# Patient Record
Sex: MALE | Race: WHITE | NOT HISPANIC OR LATINO | Employment: FULL TIME | URBAN - METROPOLITAN AREA
[De-identification: names, ages, dates, MRNs, and addresses within clinical notes are randomized per-mention and may not be internally consistent; named-entity substitution may affect disease eponyms.]

---

## 2017-01-20 ENCOUNTER — OFFICE VISIT - HEALTHEAST (OUTPATIENT)
Dept: FAMILY MEDICINE | Facility: CLINIC | Age: 57
End: 2017-01-20

## 2017-01-20 DIAGNOSIS — J32.9 SINUSITIS: ICD-10-CM

## 2017-01-20 DIAGNOSIS — B96.89 BACTERIAL CONJUNCTIVITIS OF BOTH EYES: ICD-10-CM

## 2017-01-20 DIAGNOSIS — J45.30 MILD PERSISTENT ASTHMA: ICD-10-CM

## 2017-01-20 DIAGNOSIS — H10.9 BACTERIAL CONJUNCTIVITIS OF BOTH EYES: ICD-10-CM

## 2017-01-23 ENCOUNTER — OFFICE VISIT - HEALTHEAST (OUTPATIENT)
Dept: FAMILY MEDICINE | Facility: CLINIC | Age: 57
End: 2017-01-23

## 2017-01-23 DIAGNOSIS — I10 HYPERTENSION: ICD-10-CM

## 2017-01-23 ASSESSMENT — MIFFLIN-ST. JEOR: SCORE: 1873.13

## 2017-01-24 ENCOUNTER — COMMUNICATION - HEALTHEAST (OUTPATIENT)
Dept: FAMILY MEDICINE | Facility: CLINIC | Age: 57
End: 2017-01-24

## 2017-01-24 LAB
ATRIAL RATE - MUSE: 60 BPM
DIASTOLIC BLOOD PRESSURE - MUSE: NORMAL MMHG
INTERPRETATION ECG - MUSE: NORMAL
P AXIS - MUSE: 15 DEGREES
PR INTERVAL - MUSE: 192 MS
QRS DURATION - MUSE: 94 MS
QT - MUSE: 402 MS
QTC - MUSE: 402 MS
R AXIS - MUSE: -50 DEGREES
SYSTOLIC BLOOD PRESSURE - MUSE: NORMAL MMHG
T AXIS - MUSE: 3 DEGREES
VENTRICULAR RATE- MUSE: 60 BPM

## 2017-02-02 ENCOUNTER — OFFICE VISIT - HEALTHEAST (OUTPATIENT)
Dept: FAMILY MEDICINE | Facility: CLINIC | Age: 57
End: 2017-02-02

## 2017-02-02 DIAGNOSIS — E78.5 HYPERLIPIDEMIA, UNSPECIFIED HYPERLIPIDEMIA TYPE: ICD-10-CM

## 2017-02-02 DIAGNOSIS — E66.3 OVERWEIGHT (BMI 25.0-29.9): ICD-10-CM

## 2017-02-02 DIAGNOSIS — I10 ESSENTIAL HYPERTENSION: ICD-10-CM

## 2017-02-02 DIAGNOSIS — H10.10 ALLERGIC CONJUNCTIVITIS: ICD-10-CM

## 2017-02-02 DIAGNOSIS — J45.31 MILD PERSISTENT ASTHMA WITH ACUTE EXACERBATION: ICD-10-CM

## 2017-02-02 LAB
CHOLEST SERPL-MCNC: 249 MG/DL
FASTING STATUS PATIENT QL REPORTED: YES
HDLC SERPL-MCNC: 44 MG/DL
LDLC SERPL CALC-MCNC: 170 MG/DL
TRIGL SERPL-MCNC: 175 MG/DL

## 2017-02-03 ENCOUNTER — COMMUNICATION - HEALTHEAST (OUTPATIENT)
Dept: FAMILY MEDICINE | Facility: CLINIC | Age: 57
End: 2017-02-03

## 2017-11-30 ENCOUNTER — COMMUNICATION - HEALTHEAST (OUTPATIENT)
Dept: FAMILY MEDICINE | Facility: CLINIC | Age: 57
End: 2017-11-30

## 2018-01-03 ENCOUNTER — OFFICE VISIT - HEALTHEAST (OUTPATIENT)
Dept: FAMILY MEDICINE | Facility: CLINIC | Age: 58
End: 2018-01-03

## 2018-01-03 DIAGNOSIS — M22.2X2 BILATERAL PATELLOFEMORAL SYNDROME: ICD-10-CM

## 2018-01-03 DIAGNOSIS — Z00.00 ROUTINE GENERAL MEDICAL EXAMINATION AT A HEALTH CARE FACILITY: ICD-10-CM

## 2018-01-03 DIAGNOSIS — I10 ESSENTIAL HYPERTENSION: ICD-10-CM

## 2018-01-03 DIAGNOSIS — E66.09 CLASS 1 OBESITY DUE TO EXCESS CALORIES WITHOUT SERIOUS COMORBIDITY WITH BODY MASS INDEX (BMI) OF 30.0 TO 30.9 IN ADULT: ICD-10-CM

## 2018-01-03 DIAGNOSIS — E78.5 HYPERLIPIDEMIA, UNSPECIFIED HYPERLIPIDEMIA TYPE: ICD-10-CM

## 2018-01-03 DIAGNOSIS — M79.672 LEFT FOOT PAIN: ICD-10-CM

## 2018-01-03 DIAGNOSIS — L71.9 ACNE ROSACEA: ICD-10-CM

## 2018-01-03 DIAGNOSIS — J30.9 AR (ALLERGIC RHINITIS): ICD-10-CM

## 2018-01-03 DIAGNOSIS — R73.01 IMPAIRED FASTING GLUCOSE: ICD-10-CM

## 2018-01-03 DIAGNOSIS — E66.811 CLASS 1 OBESITY DUE TO EXCESS CALORIES WITHOUT SERIOUS COMORBIDITY WITH BODY MASS INDEX (BMI) OF 30.0 TO 30.9 IN ADULT: ICD-10-CM

## 2018-01-03 DIAGNOSIS — L30.9 DERMATITIS: ICD-10-CM

## 2018-01-03 DIAGNOSIS — J45.31 MILD PERSISTENT ASTHMA WITH ACUTE EXACERBATION: ICD-10-CM

## 2018-01-03 DIAGNOSIS — H10.13 ALLERGIC CONJUNCTIVITIS OF BOTH EYES: ICD-10-CM

## 2018-01-03 DIAGNOSIS — M22.2X1 BILATERAL PATELLOFEMORAL SYNDROME: ICD-10-CM

## 2018-01-03 LAB
ANION GAP SERPL CALCULATED.3IONS-SCNC: 10 MMOL/L (ref 5–18)
BUN SERPL-MCNC: 14 MG/DL (ref 8–22)
CALCIUM SERPL-MCNC: 10.2 MG/DL (ref 8.5–10.5)
CHLORIDE BLD-SCNC: 100 MMOL/L (ref 98–107)
CHOLEST SERPL-MCNC: 308 MG/DL
CO2 SERPL-SCNC: 27 MMOL/L (ref 22–31)
CREAT SERPL-MCNC: 1.01 MG/DL (ref 0.7–1.3)
FASTING STATUS PATIENT QL REPORTED: YES
GFR SERPL CREATININE-BSD FRML MDRD: >60 ML/MIN/1.73M2
GLUCOSE BLD-MCNC: 96 MG/DL (ref 70–125)
HDLC SERPL-MCNC: 55 MG/DL
LDLC SERPL CALC-MCNC: 207 MG/DL
POTASSIUM BLD-SCNC: 4.6 MMOL/L (ref 3.5–5)
SODIUM SERPL-SCNC: 137 MMOL/L (ref 136–145)
TRIGL SERPL-MCNC: 232 MG/DL

## 2018-01-03 RX ORDER — CLOTRIMAZOLE AND BETAMETHASONE DIPROPIONATE 10; .64 MG/G; MG/G
CREAM TOPICAL
Qty: 45 G | Refills: 2 | Status: SHIPPED | OUTPATIENT
Start: 2018-01-03

## 2018-01-03 ASSESSMENT — MIFFLIN-ST. JEOR: SCORE: 1817.57

## 2018-01-04 ENCOUNTER — AMBULATORY - HEALTHEAST (OUTPATIENT)
Dept: FAMILY MEDICINE | Facility: CLINIC | Age: 58
End: 2018-01-04

## 2018-01-04 ENCOUNTER — COMMUNICATION - HEALTHEAST (OUTPATIENT)
Dept: FAMILY MEDICINE | Facility: CLINIC | Age: 58
End: 2018-01-04

## 2018-01-04 DIAGNOSIS — E78.5 HYPERLIPIDEMIA: ICD-10-CM

## 2018-01-24 ENCOUNTER — COMMUNICATION - HEALTHEAST (OUTPATIENT)
Dept: FAMILY MEDICINE | Facility: CLINIC | Age: 58
End: 2018-01-24

## 2018-02-15 ENCOUNTER — OFFICE VISIT - HEALTHEAST (OUTPATIENT)
Dept: FAMILY MEDICINE | Facility: CLINIC | Age: 58
End: 2018-02-15

## 2018-02-15 DIAGNOSIS — J01.90 ACUTE SINUSITIS: ICD-10-CM

## 2018-02-26 ENCOUNTER — COMMUNICATION - HEALTHEAST (OUTPATIENT)
Dept: FAMILY MEDICINE | Facility: CLINIC | Age: 58
End: 2018-02-26

## 2018-06-25 ENCOUNTER — OFFICE VISIT - HEALTHEAST (OUTPATIENT)
Dept: FAMILY MEDICINE | Facility: CLINIC | Age: 58
End: 2018-06-25

## 2018-06-25 ENCOUNTER — RECORDS - HEALTHEAST (OUTPATIENT)
Dept: GENERAL RADIOLOGY | Facility: CLINIC | Age: 58
End: 2018-06-25

## 2018-06-25 DIAGNOSIS — M10.9 GOUT FLARE: ICD-10-CM

## 2018-06-25 DIAGNOSIS — M79.671 PAIN IN RIGHT FOOT: ICD-10-CM

## 2018-06-25 LAB
BASOPHILS # BLD AUTO: 0.1 THOU/UL (ref 0–0.2)
BASOPHILS NFR BLD AUTO: 2 % (ref 0–2)
EOSINOPHIL # BLD AUTO: 0.3 THOU/UL (ref 0–0.4)
EOSINOPHIL NFR BLD AUTO: 7 % (ref 0–6)
ERYTHROCYTE [DISTWIDTH] IN BLOOD BY AUTOMATED COUNT: 13.1 % (ref 11–14.5)
HCT VFR BLD AUTO: 41 % (ref 40–54)
HGB BLD-MCNC: 14.1 G/DL (ref 14–18)
LYMPHOCYTES # BLD AUTO: 1.3 THOU/UL (ref 0.8–4.4)
LYMPHOCYTES NFR BLD AUTO: 27 % (ref 20–40)
MCH RBC QN AUTO: 30.9 PG (ref 27–34)
MCHC RBC AUTO-ENTMCNC: 34.4 G/DL (ref 32–36)
MCV RBC AUTO: 90 FL (ref 80–100)
MONOCYTES # BLD AUTO: 0.6 THOU/UL (ref 0–0.9)
MONOCYTES NFR BLD AUTO: 14 % (ref 2–10)
NEUTROPHILS # BLD AUTO: 2.4 THOU/UL (ref 2–7.7)
NEUTROPHILS NFR BLD AUTO: 51 % (ref 50–70)
PLATELET # BLD AUTO: 226 THOU/UL (ref 140–440)
PMV BLD AUTO: 9.9 FL (ref 8.5–12.5)
RBC # BLD AUTO: 4.56 MILL/UL (ref 4.4–6.2)
URATE SERPL-MCNC: 6.9 MG/DL (ref 3–8)
WBC: 4.7 THOU/UL (ref 4–11)

## 2018-06-25 ASSESSMENT — MIFFLIN-ST. JEOR: SCORE: 1869.05

## 2018-06-27 ENCOUNTER — COMMUNICATION - HEALTHEAST (OUTPATIENT)
Dept: FAMILY MEDICINE | Facility: CLINIC | Age: 58
End: 2018-06-27

## 2018-06-28 ENCOUNTER — COMMUNICATION - HEALTHEAST (OUTPATIENT)
Dept: SCHEDULING | Facility: CLINIC | Age: 58
End: 2018-06-28

## 2018-06-28 ENCOUNTER — RECORDS - HEALTHEAST (OUTPATIENT)
Dept: ADMINISTRATIVE | Facility: OTHER | Age: 58
End: 2018-06-28

## 2018-07-16 ENCOUNTER — COMMUNICATION - HEALTHEAST (OUTPATIENT)
Dept: SCHEDULING | Facility: CLINIC | Age: 58
End: 2018-07-16

## 2018-07-16 ENCOUNTER — COMMUNICATION - HEALTHEAST (OUTPATIENT)
Dept: FAMILY MEDICINE | Facility: CLINIC | Age: 58
End: 2018-07-16

## 2018-07-16 DIAGNOSIS — M10.9 ACUTE GOUT, UNSPECIFIED CAUSE, UNSPECIFIED SITE: ICD-10-CM

## 2018-07-24 ENCOUNTER — COMMUNICATION - HEALTHEAST (OUTPATIENT)
Dept: FAMILY MEDICINE | Facility: CLINIC | Age: 58
End: 2018-07-24

## 2018-07-24 DIAGNOSIS — M1A.9XX1 CHRONIC GOUT WITH TOPHUS, UNSPECIFIED CAUSE, UNSPECIFIED SITE: ICD-10-CM

## 2019-01-31 ENCOUNTER — COMMUNICATION - HEALTHEAST (OUTPATIENT)
Dept: FAMILY MEDICINE | Facility: CLINIC | Age: 59
End: 2019-01-31

## 2019-01-31 DIAGNOSIS — I10 ESSENTIAL HYPERTENSION: ICD-10-CM

## 2019-05-08 ENCOUNTER — COMMUNICATION - HEALTHEAST (OUTPATIENT)
Dept: FAMILY MEDICINE | Facility: CLINIC | Age: 59
End: 2019-05-08

## 2019-05-08 DIAGNOSIS — I10 ESSENTIAL HYPERTENSION: ICD-10-CM

## 2019-05-31 ENCOUNTER — COMMUNICATION - HEALTHEAST (OUTPATIENT)
Dept: FAMILY MEDICINE | Facility: CLINIC | Age: 59
End: 2019-05-31

## 2019-05-31 ENCOUNTER — COMMUNICATION - HEALTHEAST (OUTPATIENT)
Dept: TELEHEALTH | Facility: CLINIC | Age: 59
End: 2019-05-31

## 2019-05-31 ENCOUNTER — OFFICE VISIT - HEALTHEAST (OUTPATIENT)
Dept: FAMILY MEDICINE | Facility: CLINIC | Age: 59
End: 2019-05-31

## 2019-05-31 DIAGNOSIS — R73.01 IMPAIRED FASTING GLUCOSE: ICD-10-CM

## 2019-05-31 DIAGNOSIS — M25.50 ARTHRALGIA OF MULTIPLE JOINTS: ICD-10-CM

## 2019-05-31 DIAGNOSIS — J30.9 AR (ALLERGIC RHINITIS): ICD-10-CM

## 2019-05-31 DIAGNOSIS — M1A.9XX1 CHRONIC GOUT WITH TOPHUS, UNSPECIFIED CAUSE, UNSPECIFIED SITE: ICD-10-CM

## 2019-05-31 DIAGNOSIS — I10 ESSENTIAL HYPERTENSION: ICD-10-CM

## 2019-05-31 DIAGNOSIS — E66.811 CLASS 1 OBESITY DUE TO EXCESS CALORIES WITH SERIOUS COMORBIDITY AND BODY MASS INDEX (BMI) OF 30.0 TO 30.9 IN ADULT: ICD-10-CM

## 2019-05-31 DIAGNOSIS — Z00.00 ROUTINE GENERAL MEDICAL EXAMINATION AT A HEALTH CARE FACILITY: ICD-10-CM

## 2019-05-31 DIAGNOSIS — L71.9 ACNE ROSACEA: ICD-10-CM

## 2019-05-31 DIAGNOSIS — J45.31 MILD PERSISTENT ASTHMA WITH ACUTE EXACERBATION: ICD-10-CM

## 2019-05-31 DIAGNOSIS — E78.5 HYPERLIPIDEMIA, UNSPECIFIED HYPERLIPIDEMIA TYPE: ICD-10-CM

## 2019-05-31 DIAGNOSIS — H10.13 ALLERGIC CONJUNCTIVITIS OF BOTH EYES: ICD-10-CM

## 2019-05-31 DIAGNOSIS — B19.20 HEPATITIS C VIRUS INFECTION WITHOUT HEPATIC COMA, UNSPECIFIED CHRONICITY: ICD-10-CM

## 2019-05-31 DIAGNOSIS — E66.09 CLASS 1 OBESITY DUE TO EXCESS CALORIES WITH SERIOUS COMORBIDITY AND BODY MASS INDEX (BMI) OF 30.0 TO 30.9 IN ADULT: ICD-10-CM

## 2019-05-31 LAB
ALBUMIN SERPL-MCNC: 4.9 G/DL (ref 3.5–5)
ALP SERPL-CCNC: 86 U/L (ref 45–120)
ALT SERPL W P-5'-P-CCNC: 34 U/L (ref 0–45)
ANION GAP SERPL CALCULATED.3IONS-SCNC: 11 MMOL/L (ref 5–18)
AST SERPL W P-5'-P-CCNC: 26 U/L (ref 0–40)
BILIRUB SERPL-MCNC: 0.8 MG/DL (ref 0–1)
BUN SERPL-MCNC: 15 MG/DL (ref 8–22)
C REACTIVE PROTEIN LHE: 0.2 MG/DL (ref 0–0.8)
CALCIUM SERPL-MCNC: 10.4 MG/DL (ref 8.5–10.5)
CHLORIDE BLD-SCNC: 101 MMOL/L (ref 98–107)
CHOLEST SERPL-MCNC: 252 MG/DL
CO2 SERPL-SCNC: 24 MMOL/L (ref 22–31)
CREAT SERPL-MCNC: 1.11 MG/DL (ref 0.7–1.3)
ERYTHROCYTE [DISTWIDTH] IN BLOOD BY AUTOMATED COUNT: 12.7 % (ref 11–14.5)
ERYTHROCYTE [SEDIMENTATION RATE] IN BLOOD BY WESTERGREN METHOD: 5 MM/HR (ref 0–15)
FASTING STATUS PATIENT QL REPORTED: YES
GFR SERPL CREATININE-BSD FRML MDRD: >60 ML/MIN/1.73M2
GLUCOSE BLD-MCNC: 100 MG/DL (ref 70–125)
HBA1C MFR BLD: 5.8 % (ref 3.5–6)
HCT VFR BLD AUTO: 44.8 % (ref 40–54)
HDLC SERPL-MCNC: 52 MG/DL
HGB BLD-MCNC: 15.2 G/DL (ref 14–18)
LDLC SERPL CALC-MCNC: 174 MG/DL
MCH RBC QN AUTO: 31.4 PG (ref 27–34)
MCHC RBC AUTO-ENTMCNC: 33.9 G/DL (ref 32–36)
MCV RBC AUTO: 92 FL (ref 80–100)
PLATELET # BLD AUTO: 239 THOU/UL (ref 140–440)
PMV BLD AUTO: 7.3 FL (ref 7–10)
POTASSIUM BLD-SCNC: 4.8 MMOL/L (ref 3.5–5)
PROT SERPL-MCNC: 7.7 G/DL (ref 6–8)
PSA SERPL-MCNC: 1.1 NG/ML (ref 0–3.5)
RBC # BLD AUTO: 4.84 MILL/UL (ref 4.4–6.2)
RHEUMATOID FACT SERPL-ACNC: <15 IU/ML (ref 0–30)
SODIUM SERPL-SCNC: 136 MMOL/L (ref 136–145)
TRIGL SERPL-MCNC: 129 MG/DL
URATE SERPL-MCNC: 8.2 MG/DL (ref 3–8)
WBC: 4.6 THOU/UL (ref 4–11)

## 2019-05-31 ASSESSMENT — MIFFLIN-ST. JEOR: SCORE: 1813.03

## 2019-06-02 LAB — HCV AB SERPL QL IA: POSITIVE

## 2019-06-03 LAB
ANA SER QL: 0.2 U
B BURGDOR IGG+IGM SER QL: 0.1 INDEX VALUE

## 2019-06-06 ENCOUNTER — COMMUNICATION - HEALTHEAST (OUTPATIENT)
Dept: FAMILY MEDICINE | Facility: CLINIC | Age: 59
End: 2019-06-06

## 2019-06-06 LAB
HCV RNA SERPL NAA+PROBE-ACNC: NORMAL [IU]/ML
HCV RNA SERPL NAA+PROBE-LOG IU: NORMAL LOG IU/ML

## 2019-06-13 ENCOUNTER — COMMUNICATION - HEALTHEAST (OUTPATIENT)
Dept: CARDIOLOGY | Facility: CLINIC | Age: 59
End: 2019-06-13

## 2019-06-14 ENCOUNTER — COMMUNICATION - HEALTHEAST (OUTPATIENT)
Dept: FAMILY MEDICINE | Facility: CLINIC | Age: 59
End: 2019-06-14

## 2019-06-18 ENCOUNTER — HOSPITAL ENCOUNTER (OUTPATIENT)
Dept: CT IMAGING | Facility: CLINIC | Age: 59
Discharge: HOME | End: 2019-06-18
Attending: FAMILY MEDICINE

## 2019-06-18 DIAGNOSIS — E78.5 HYPERLIPIDEMIA, UNSPECIFIED HYPERLIPIDEMIA TYPE: ICD-10-CM

## 2019-06-18 DIAGNOSIS — I10 ESSENTIAL HYPERTENSION: ICD-10-CM

## 2019-06-18 DIAGNOSIS — R73.01 IMPAIRED FASTING GLUCOSE: ICD-10-CM

## 2019-06-27 ENCOUNTER — HOSPITAL ENCOUNTER (OUTPATIENT)
Dept: CT IMAGING | Facility: CLINIC | Age: 59
Discharge: HOME | End: 2019-06-27
Attending: FAMILY MEDICINE

## 2019-06-27 LAB
BSA FOR ECHO PROCEDURE: 2.22 M2
CV CALCIUM SCORE AGATSTON LM: 115
CV CALCIUM SCORING AGATSON LAD: 49
CV CALCIUM SCORING AGATSTON CX: 0
CV CALCIUM SCORING AGATSTON RCA: 4
CV CALCIUM SCORING AGATSTON TOTAL: 168
LEFT VENTRICLE HEART RATE: 62 BPM

## 2019-06-27 ASSESSMENT — MIFFLIN-ST. JEOR: SCORE: 1813.03

## 2019-07-08 ENCOUNTER — AMBULATORY - HEALTHEAST (OUTPATIENT)
Dept: FAMILY MEDICINE | Facility: CLINIC | Age: 59
End: 2019-07-08

## 2019-07-08 DIAGNOSIS — R93.1 ABNORMAL FINDINGS ON DIAGNOSTIC IMAGING OF HEART AND CORONARY CIRCULATION: ICD-10-CM

## 2019-08-01 ENCOUNTER — COMMUNICATION - HEALTHEAST (OUTPATIENT)
Dept: FAMILY MEDICINE | Facility: CLINIC | Age: 59
End: 2019-08-01

## 2019-08-01 DIAGNOSIS — I10 ESSENTIAL HYPERTENSION: ICD-10-CM

## 2019-08-13 ENCOUNTER — OFFICE VISIT - HEALTHEAST (OUTPATIENT)
Dept: CARDIOLOGY | Facility: CLINIC | Age: 59
End: 2019-08-13

## 2019-08-13 DIAGNOSIS — E78.2 MIXED HYPERLIPIDEMIA: ICD-10-CM

## 2019-08-13 DIAGNOSIS — I25.10 CORONARY ARTERY DISEASE DUE TO CALCIFIED CORONARY LESION: ICD-10-CM

## 2019-08-13 DIAGNOSIS — I25.84 CORONARY ARTERY DISEASE DUE TO CALCIFIED CORONARY LESION: ICD-10-CM

## 2019-08-13 DIAGNOSIS — I10 ESSENTIAL HYPERTENSION: ICD-10-CM

## 2019-08-13 ASSESSMENT — MIFFLIN-ST. JEOR: SCORE: 1781.28

## 2019-08-19 ENCOUNTER — HOSPITAL ENCOUNTER (OUTPATIENT)
Dept: CARDIOLOGY | Facility: CLINIC | Age: 59
Discharge: HOME | End: 2019-08-19
Attending: INTERNAL MEDICINE

## 2019-08-19 DIAGNOSIS — I25.10 CORONARY ARTERY DISEASE DUE TO CALCIFIED CORONARY LESION: ICD-10-CM

## 2019-08-19 DIAGNOSIS — I25.84 CORONARY ARTERY DISEASE DUE TO CALCIFIED CORONARY LESION: ICD-10-CM

## 2019-08-19 LAB
CV STRESS CURRENT BP HE: NORMAL
CV STRESS CURRENT HR HE: 101
CV STRESS CURRENT HR HE: 101
CV STRESS CURRENT HR HE: 102
CV STRESS CURRENT HR HE: 104
CV STRESS CURRENT HR HE: 106
CV STRESS CURRENT HR HE: 107
CV STRESS CURRENT HR HE: 113
CV STRESS CURRENT HR HE: 116
CV STRESS CURRENT HR HE: 119
CV STRESS CURRENT HR HE: 123
CV STRESS CURRENT HR HE: 124
CV STRESS CURRENT HR HE: 125
CV STRESS CURRENT HR HE: 131
CV STRESS CURRENT HR HE: 134
CV STRESS CURRENT HR HE: 138
CV STRESS CURRENT HR HE: 138
CV STRESS CURRENT HR HE: 145
CV STRESS CURRENT HR HE: 146
CV STRESS CURRENT HR HE: 146
CV STRESS CURRENT HR HE: 153
CV STRESS CURRENT HR HE: 158
CV STRESS CURRENT HR HE: 160
CV STRESS CURRENT HR HE: 62
CV STRESS CURRENT HR HE: 64
CV STRESS CURRENT HR HE: 90
CV STRESS CURRENT HR HE: 97
CV STRESS CURRENT HR HE: 97
CV STRESS CURRENT HR HE: 99
CV STRESS DEVIATION TIME HE: NORMAL
CV STRESS ECHO PERCENT HR HE: NORMAL
CV STRESS EXERCISE STAGE HE: NORMAL
CV STRESS FINAL RESTING BP HE: NORMAL
CV STRESS FINAL RESTING HR HE: 97
CV STRESS MAX HR HE: 161
CV STRESS MAX TREADMILL GRADE HE: 16
CV STRESS MAX TREADMILL SPEED HE: 4.2
CV STRESS PEAK DIA BP HE: NORMAL
CV STRESS PEAK SYS BP HE: NORMAL
CV STRESS PHASE HE: NORMAL
CV STRESS PROTOCOL HE: NORMAL
CV STRESS RESTING PT POSITION HE: NORMAL
CV STRESS RESTING PT POSITION HE: NORMAL
CV STRESS ST DEVIATION AMOUNT HE: NORMAL
CV STRESS ST DEVIATION ELEVATION HE: NORMAL
CV STRESS ST EVELATION AMOUNT HE: NORMAL
CV STRESS TEST TYPE HE: NORMAL
CV STRESS TOTAL STAGE TIME MIN 1 HE: NORMAL
ECHO EJECTION FRACTION ESTIMATED: 60 %
STRESS ECHO BASELINE BP: NORMAL
STRESS ECHO BASELINE HR: 61
STRESS ECHO CALCULATED PERCENT HR: 99 %
STRESS ECHO LAST STRESS BP: NORMAL
STRESS ECHO LAST STRESS HR: 158
STRESS ECHO POST ESTIMATED WORKLOAD: 11.1
STRESS ECHO POST EXERCISE DUR MIN: 9
STRESS ECHO POST EXERCISE DUR SEC: 30
STRESS ECHO TARGET HR: 138

## 2019-10-22 ENCOUNTER — COMMUNICATION - HEALTHEAST (OUTPATIENT)
Dept: SCHEDULING | Facility: CLINIC | Age: 59
End: 2019-10-22

## 2019-10-22 DIAGNOSIS — I10 ESSENTIAL HYPERTENSION: ICD-10-CM

## 2019-12-06 ENCOUNTER — OFFICE VISIT - HEALTHEAST (OUTPATIENT)
Dept: FAMILY MEDICINE | Facility: CLINIC | Age: 59
End: 2019-12-06

## 2019-12-06 DIAGNOSIS — M1A.9XX1 CHRONIC GOUT WITH TOPHUS, UNSPECIFIED CAUSE, UNSPECIFIED SITE: ICD-10-CM

## 2019-12-06 DIAGNOSIS — G89.29 CHRONIC PAIN OF RIGHT KNEE: ICD-10-CM

## 2019-12-06 DIAGNOSIS — M25.561 CHRONIC PAIN OF RIGHT KNEE: ICD-10-CM

## 2019-12-06 DIAGNOSIS — R73.01 IMPAIRED FASTING GLUCOSE: ICD-10-CM

## 2019-12-06 DIAGNOSIS — Z23 NEED FOR VACCINATION: ICD-10-CM

## 2019-12-06 DIAGNOSIS — M54.31 SCIATICA OF RIGHT SIDE: ICD-10-CM

## 2019-12-06 DIAGNOSIS — E78.5 HYPERLIPIDEMIA, UNSPECIFIED HYPERLIPIDEMIA TYPE: ICD-10-CM

## 2019-12-06 DIAGNOSIS — I10 ESSENTIAL HYPERTENSION: ICD-10-CM

## 2019-12-06 LAB
ALBUMIN SERPL-MCNC: 4.8 G/DL (ref 3.5–5)
ALP SERPL-CCNC: 95 U/L (ref 45–120)
ALT SERPL W P-5'-P-CCNC: 42 U/L (ref 0–45)
ANION GAP SERPL CALCULATED.3IONS-SCNC: 9 MMOL/L (ref 5–18)
AST SERPL W P-5'-P-CCNC: 26 U/L (ref 0–40)
BILIRUB SERPL-MCNC: 1 MG/DL (ref 0–1)
BUN SERPL-MCNC: 15 MG/DL (ref 8–22)
CALCIUM SERPL-MCNC: 10 MG/DL (ref 8.5–10.5)
CHLORIDE BLD-SCNC: 102 MMOL/L (ref 98–107)
CHOLEST SERPL-MCNC: 188 MG/DL
CO2 SERPL-SCNC: 27 MMOL/L (ref 22–31)
CREAT SERPL-MCNC: 1 MG/DL (ref 0.7–1.3)
FASTING STATUS PATIENT QL REPORTED: YES
GFR SERPL CREATININE-BSD FRML MDRD: >60 ML/MIN/1.73M2
GLUCOSE BLD-MCNC: 86 MG/DL (ref 70–125)
HBA1C MFR BLD: 5.8 % (ref 3.5–6)
HDLC SERPL-MCNC: 60 MG/DL
LDLC SERPL CALC-MCNC: 101 MG/DL
POTASSIUM BLD-SCNC: 4.5 MMOL/L (ref 3.5–5)
PROT SERPL-MCNC: 7.6 G/DL (ref 6–8)
SODIUM SERPL-SCNC: 138 MMOL/L (ref 136–145)
TRIGL SERPL-MCNC: 133 MG/DL
URATE SERPL-MCNC: 7.2 MG/DL (ref 3–8)

## 2020-01-21 ENCOUNTER — COMMUNICATION - HEALTHEAST (OUTPATIENT)
Dept: FAMILY MEDICINE | Facility: CLINIC | Age: 60
End: 2020-01-21

## 2020-01-29 ENCOUNTER — AMBULATORY - HEALTHEAST (OUTPATIENT)
Dept: FAMILY MEDICINE | Facility: CLINIC | Age: 60
End: 2020-01-29

## 2020-01-29 DIAGNOSIS — L71.9 ROSACEA: ICD-10-CM

## 2020-02-17 ENCOUNTER — RECORDS - HEALTHEAST (OUTPATIENT)
Dept: ADMINISTRATIVE | Facility: OTHER | Age: 60
End: 2020-02-17

## 2020-04-27 ENCOUNTER — COMMUNICATION - HEALTHEAST (OUTPATIENT)
Dept: FAMILY MEDICINE | Facility: CLINIC | Age: 60
End: 2020-04-27

## 2020-04-27 DIAGNOSIS — I10 ESSENTIAL HYPERTENSION: ICD-10-CM

## 2020-06-17 ENCOUNTER — COMMUNICATION - HEALTHEAST (OUTPATIENT)
Dept: FAMILY MEDICINE | Facility: CLINIC | Age: 60
End: 2020-06-17

## 2020-06-17 DIAGNOSIS — J30.9 AR (ALLERGIC RHINITIS): ICD-10-CM

## 2020-06-17 DIAGNOSIS — H10.13 ALLERGIC CONJUNCTIVITIS OF BOTH EYES: ICD-10-CM

## 2020-08-14 ENCOUNTER — COMMUNICATION - HEALTHEAST (OUTPATIENT)
Dept: FAMILY MEDICINE | Facility: CLINIC | Age: 60
End: 2020-08-14

## 2020-08-14 DIAGNOSIS — E78.5 HYPERLIPIDEMIA: ICD-10-CM

## 2020-11-09 ENCOUNTER — OFFICE VISIT - HEALTHEAST (OUTPATIENT)
Dept: CARDIOLOGY | Facility: CLINIC | Age: 60
End: 2020-11-09

## 2020-11-09 DIAGNOSIS — E66.811 CLASS 1 OBESITY DUE TO EXCESS CALORIES WITHOUT SERIOUS COMORBIDITY WITH BODY MASS INDEX (BMI) OF 31.0 TO 31.9 IN ADULT: ICD-10-CM

## 2020-11-09 DIAGNOSIS — E78.2 MIXED HYPERLIPIDEMIA: ICD-10-CM

## 2020-11-09 DIAGNOSIS — I25.10 CORONARY ARTERY DISEASE INVOLVING NATIVE CORONARY ARTERY OF NATIVE HEART WITHOUT ANGINA PECTORIS: ICD-10-CM

## 2020-11-09 DIAGNOSIS — I10 ESSENTIAL HYPERTENSION: ICD-10-CM

## 2020-11-09 DIAGNOSIS — E66.09 CLASS 1 OBESITY DUE TO EXCESS CALORIES WITHOUT SERIOUS COMORBIDITY WITH BODY MASS INDEX (BMI) OF 31.0 TO 31.9 IN ADULT: ICD-10-CM

## 2020-11-09 ASSESSMENT — MIFFLIN-ST. JEOR: SCORE: 1826.64

## 2020-11-24 ENCOUNTER — COMMUNICATION - HEALTHEAST (OUTPATIENT)
Dept: CARDIOLOGY | Facility: CLINIC | Age: 60
End: 2020-11-24

## 2020-11-24 DIAGNOSIS — I10 ESSENTIAL HYPERTENSION: ICD-10-CM

## 2020-12-10 ENCOUNTER — COMMUNICATION - HEALTHEAST (OUTPATIENT)
Dept: FAMILY MEDICINE | Facility: CLINIC | Age: 60
End: 2020-12-10

## 2020-12-14 ENCOUNTER — AMBULATORY - HEALTHEAST (OUTPATIENT)
Dept: NURSING | Facility: CLINIC | Age: 60
End: 2020-12-14

## 2020-12-14 ENCOUNTER — AMBULATORY - HEALTHEAST (OUTPATIENT)
Dept: FAMILY MEDICINE | Facility: CLINIC | Age: 60
End: 2020-12-14

## 2020-12-14 DIAGNOSIS — Z23 NEED FOR ZOSTER VACCINATION: ICD-10-CM

## 2020-12-14 DIAGNOSIS — Z00.00 HEALTH CARE MAINTENANCE: ICD-10-CM

## 2021-02-26 ENCOUNTER — COMMUNICATION - HEALTHEAST (OUTPATIENT)
Dept: FAMILY MEDICINE | Facility: CLINIC | Age: 61
End: 2021-02-26

## 2021-02-26 DIAGNOSIS — I10 ESSENTIAL HYPERTENSION: ICD-10-CM

## 2021-03-02 ENCOUNTER — COMMUNICATION - HEALTHEAST (OUTPATIENT)
Dept: FAMILY MEDICINE | Facility: CLINIC | Age: 61
End: 2021-03-02

## 2021-03-02 DIAGNOSIS — J45.31 MILD PERSISTENT ASTHMA WITH ACUTE EXACERBATION: ICD-10-CM

## 2021-03-02 RX ORDER — ALBUTEROL SULFATE 90 UG/1
2 AEROSOL, METERED RESPIRATORY (INHALATION) EVERY 4 HOURS PRN
Qty: 3 INHALER | Refills: 3 | Status: SHIPPED | OUTPATIENT
Start: 2021-03-02 | End: 2021-08-04

## 2021-03-24 ENCOUNTER — COMMUNICATION - HEALTHEAST (OUTPATIENT)
Dept: FAMILY MEDICINE | Facility: CLINIC | Age: 61
End: 2021-03-24

## 2021-05-28 ASSESSMENT — ASTHMA QUESTIONNAIRES: ACT_TOTALSCORE: 24

## 2021-05-28 NOTE — TELEPHONE ENCOUNTER
Patient is scheduling office visit for hypertension check up with Dr. Mg.  Will patient be bridged to the appointment?          Refill Request  Did you contact pharmacy: Yes  Medication name:   Requested Prescriptions     Pending Prescriptions Disp Refills     lisinopril (PRINIVIL,ZESTRIL) 10 MG tablet 90 tablet 0     Si tablet (10 mg total) daily.     Who prescribed the medication: Hardik Mg MD   Pharmacy Name and Location: [facility]  Is patient out of medication: No.  Five days left  Patient notified refills processed in 72 hours:  yes  Okay to leave a detailed message: yes

## 2021-05-29 ENCOUNTER — RECORDS - HEALTHEAST (OUTPATIENT)
Dept: ADMINISTRATIVE | Facility: CLINIC | Age: 61
End: 2021-05-29

## 2021-05-29 NOTE — PROGRESS NOTES
Assessment/Plan:     1. Routine general medical examination at a health care facility  Routine healthcare maintenance.  Preventative cares reviewed.  Hepatitis C and PSA based on age criteria.  Immunizations reviewed and up-to-date.  Colonoscopy January 23, 2013 with hyperplastic polyp x2.  Repeat at 10-year interval.  Annual physical exams to continue.  - Hepatitis C Antibody (Anti-HCV)  - PSA (Prostatic-Specific Antigen), Annual Screen    2. Class 1 obesity due to excess calories with serious comorbidity and body mass index (BMI) of 30.0 to 30.9 in adult  Dietary and exercise modification for weight goal less than 210 pounds initially, less than 200 pounds ideally.  Fasting glucose and A1c pending.    3. Essential hypertension  Patient continues lisinopril 10 mg daily.  Med monitoring completed today with renal function and electrolytes.  - Comprehensive Metabolic Panel  - lisinopril (PRINIVIL,ZESTRIL) 10 MG tablet; TAKE 1 TABLET (10 MG) BY MOUTH DAILY  Dispense: 90 tablet; Refill: 3  - atorvastatin (LIPITOR) 20 MG tablet; Take 1 tablet (20 mg total) by mouth at bedtime.  Dispense: 90 tablet; Refill: 3  - CT Cardiac Calcium Score; Future    4. Hyperlipidemia, unspecified hyperlipidemia type  History of hyperlipidemia.  Prior LDL greater than 190.  Had prescribed atorvastatin however never filled prescription.  Check lipid cascade today.  Encourage patient to consider starting atorvastatin 20 mg daily then reassess lipid cascade at follow-up in 3 to 6 months.  CT cardiac calcium score also to be completed for risk factor determination with underlying hypertension, hyperlipidemia etc.  - Lipid Cascade  - Comprehensive Metabolic Panel  - CT Cardiac Calcium Score; Future    5. Mild persistent asthma with acute exacerbation  Continue Symbicort 160/4.5 using 2 puffs twice daily.  Asthma control test 21 out of 25 today.  Albuterol metered-dose inhaler on as-needed basis.  - budesonide-formoterol (SYMBICORT) 160-4.5  mcg/actuation inhaler; Inhale 2 puffs 2 (two) times a day.  Dispense: 3 Inhaler; Refill: 3  - albuterol (PROAIR HFA;PROVENTIL HFA;VENTOLIN HFA) 90 mcg/actuation inhaler; Inhale 2 puffs every 4 (four) hours as needed for wheezing or shortness of breath.  Dispense: 3 Inhaler; Refill: 3    6. Impaired fasting glucose  History of impaired fasting glucose.  Fasting glucose and A1c pending.  - Comprehensive Metabolic Panel  - Glycosylated Hemoglobin A1c    7. Allergic conjunctivitis of both eyes  Refill on Patanol ophthalmic drops.  - olopatadine (PATANOL) 0.1 % ophthalmic solution; one drop both eyes twice daily  Dispense: 15 mL; Refill: 3    8. AR (allergic rhinitis)  Refill on fluticasone nasal spray as directed.  - fluticasone propionate (FLONASE) 50 mcg/actuation nasal spray; 2 sprays into each nostril daily.  Dispense: 48 g; Refill: 3    9. Chronic gout with tophus, unspecified cause, unspecified site  Check uric acid, CBC.  Colchicine 0.6 mg using 2 tablets at onset may repeat 1 tablet in 1 hour.  No longer using indomethacin.  - Uric Acid  - HM2(CBC w/o Differential)  - colchicine (COLCRYS) 0.6 mg tablet; Take 2 tablets by mouth at onset of gout symptoms then 1 tablet by mouth 1 hour later x 1  Dispense: 30 tablet; Refill: 2    10. Arthralgia of multiple joints  Multiple arthralgias.  Check arthritis panel as noted.  Family history of osteoarthritis and hip issues.  Would complete further evaluation including x-rays if persistent concerns or if worsening.  - Comprehensive Metabolic Panel  - HM2(CBC w/o Differential)  - Lyme Antibody Cascade  - C-Reactive Protein  - Erythrocyte Sedimentation Rate  - Rheumatoid Factor Quant  - Antinuclear Antibody (VANESSA) Cascade    11. Acne rosacea  Acne rosacea suboptimal control.  Using MetroGel on as-needed basis only.  Did encourage patient to use on scheduled twice daily basis with pustular rosacea.  Patient declines referral to dermatologist currently.  - metroNIDAZOLE  (METROGEL) 1 % gel; apply topically to affected areas twice daily for rosacea management  Dispense: 180 g; Refill: 3       The following high BMI interventions were performed this visit: encouragement to exercise, weight monitoring, weight loss from baseline weight and lifestyle education regarding diet.  Ensure ongoing efforts to achieve weight goal < 210 pounds initially, < 200 pounds ideally.              Subjective:      Obdulio Ambrocio is a 59 y.o. male who presents for an annual exam.  Physical exam.  Multiple concerns.  Needs refills.  Lisinopril 10 mg daily for hypertension.  Was prescribed atorvastatin 20 mg daily due to hyperlipidemia with total cholesterol 308, triglycerides 232, HDL 55 and  on January 3, 2018 however never started medicine.  1 pound weight loss since that visit.  Wondering about other cardiac evaluation.  Asymptomatic regarding chest pain or palpitations.  Some shortness of breath especially if he overeats.  History of asthma.  Using Symbicort 160/4.5 using 2 puffs twice daily.  MetroGel as needed only for rosacea.  Area of irritation left forehead especially that does not seem to heal properly and can bleed if irritated.  Had used Colcrys January 2019 otherwise no recent gout attacks.  Prior colonoscopy January 23, 2013 with hyperplastic polyp x2 otherwise told to repeat 10-year interval.  Multiple joint issues especially in hips and knees.  No significant involvement of wrists or fingers.  Family history of hip issues with patient's sibling and parent.  Wondering about possible Lyme disease however unaware of recent deer tick exposure.  Comprehensive review of systems as above otherwise all negative.    Healthy Habits:   Regular Exercise: Yes and walking at work otherwise no  Healthy Diet: Yes  Dental Visits Regularly: Yes  Seat Belt: Yes   Sexually active: Yes  Colonoscopy: Yes and 1/23/13 (hyperplastic polyp x 2) - repeat in 10 years  Lipid Profile: Yes  Glucose Screen:  Yes    Immunization History   Administered Date(s) Administered     Hep A, historic 11/18/2002, 04/01/2005     Influenza, inj, historic,unspecified 10/01/2016, 10/20/2017     Influenza, seasonal,quad inj 6-35 mos 09/18/2009     Influenza,seasonal quad, PF 12/27/2013     Pneumo Polysac 23-V 02/02/2017     Td, adult adsorbed, PF 01/03/2018     Td,adult,historic,unspecified 06/10/1998     Tdap 01/08/2008     Immunization status: up to date and documented.  Vision Screening:both eyes  Hearing: PASS     Current Outpatient Medications   Medication Sig Dispense Refill     albuterol (PROAIR HFA;PROVENTIL HFA;VENTOLIN HFA) 90 mcg/actuation inhaler Inhale 2 puffs every 4 (four) hours as needed for wheezing or shortness of breath. 3 Inhaler 3     clotrimazole-betamethasone (LOTRISONE) cream apply topically sparingly to affected areas twice daily as needed 45 g 2     colchicine (COLCRYS) 0.6 mg tablet Take 2 tablets by mouth at onset of gout symptoms then 1 tablet by mouth 1 hour later x 1 30 tablet 2     lisinopril (PRINIVIL,ZESTRIL) 10 MG tablet TAKE 1 TABLET (10 MG) BY MOUTH DAILY 90 tablet 3     metroNIDAZOLE (METROGEL) 1 % gel apply topically to affected areas twice daily for rosacea management 180 g 3     atorvastatin (LIPITOR) 20 MG tablet Take 1 tablet (20 mg total) by mouth at bedtime. 90 tablet 3     budesonide-formoterol (SYMBICORT) 160-4.5 mcg/actuation inhaler Inhale 2 puffs 2 (two) times a day. 3 Inhaler 3     fluticasone propionate (FLONASE) 50 mcg/actuation nasal spray 2 sprays into each nostril daily. 48 g 3     olopatadine (PATANOL) 0.1 % ophthalmic solution one drop both eyes twice daily 15 mL 3     No current facility-administered medications for this visit.      Past Medical History:   Diagnosis Date     Hypertension      History reviewed. No pertinent surgical history.  Grass and Pollen  No family history on file.  Social History     Socioeconomic History     Marital status:      Spouse name: Not on  "file     Number of children: Not on file     Years of education: Not on file     Highest education level: Not on file   Occupational History     Not on file   Social Needs     Financial resource strain: Not on file     Food insecurity:     Worry: Not on file     Inability: Not on file     Transportation needs:     Medical: Not on file     Non-medical: Not on file   Tobacco Use     Smoking status: Never Smoker     Smokeless tobacco: Never Used   Substance and Sexual Activity     Alcohol use: Yes     Alcohol/week: 3.0 - 3.6 oz     Types: 5 - 6 Standard drinks or equivalent per week     Drug use: No     Sexual activity: Not on file   Lifestyle     Physical activity:     Days per week: Not on file     Minutes per session: Not on file     Stress: Not on file   Relationships     Social connections:     Talks on phone: Not on file     Gets together: Not on file     Attends Hoahaoism service: Not on file     Active member of club or organization: Not on file     Attends meetings of clubs or organizations: Not on file     Relationship status: Not on file     Intimate partner violence:     Fear of current or ex partner: Not on file     Emotionally abused: Not on file     Physically abused: Not on file     Forced sexual activity: Not on file   Other Topics Concern     Not on file   Social History Narrative     Not on file       Review of Systems  Comprehensive ROS: as above, otherwise all negative.           Objective:     /70   Pulse 74   Ht 5' 10.5\" (1.791 m)   Wt 218 lb (98.9 kg)   SpO2 97%   BMI 30.84 kg/m    Body mass index is 30.84 kg/m .    Physical    General Appearance:    Alert, cooperative, no distress, appears stated age.  Obesity.   Head:    Normocephalic, without obvious abnormality, atraumatic   Eyes:    PERRL, conjunctiva/corneas clear, EOM's intact, fundi     benign, both eyes        Ears:    Normal TM's and external ear canals, both ears   Nose:   Nares normal, septum midline, mucosa normal, no " drainage    or sinus tenderness   Throat:   Lips, mucosa, and tongue normal; teeth and gums normal   Neck:   Supple, symmetrical, trachea midline, no adenopathy;        thyroid:  No enlargement/tenderness/nodules; no carotid    bruit or JVD   Back:     Symmetric, no curvature, ROM normal, no CVA tenderness   Lungs:     Clear to auscultation bilaterally, respirations unlabored   Chest wall:    No tenderness or deformity   Heart:    Regular rate and rhythm, S1 and S2 normal, no murmur, rub   or gallop   Abdomen:     Soft, non-tender, bowel sounds active all four quadrants,     no masses, no organomegaly.     Genitalia:    Normal male without lesion, discharge or tenderness.  No inguinal hernia noted.     Rectal:    Normal tone.  Prostate normal/symmetric, no masses or tenderness.   Extremities:   Extremities normal, atraumatic, no cyanosis or edema   Pulses:   2+ and symmetric all extremities   Skin:   Skin color, texture, turgor normal, significant acne rosacea with mild pustular component.   Lymph nodes:   Cervical, supraclavicular, and axillary nodes normal   Neurologic:   CNII-XII intact. Normal strength, sensation and reflexes       throughout                This note has been dictated using voice recognition software and as a result may contain minor grammatical errors and unintended word substitutions.

## 2021-05-29 NOTE — TELEPHONE ENCOUNTER
Dr. Mg ~    Upon calling [facility] to obtain prior authorization for patient's upcoming CT scan they informed me of the following :    Per [facility] - CPT 02203 ( CT Cardiac Calcium Score) is not covered under patient's plan. CPT's - 27638, 62367, and 00562 are all covered procedures that do not require a PA if the provider would like to change the order.    Please change order to either CPT 97279, 13691, or 58132 - whichever is most similar and appropriate - if you agree.     Thank you!    ~ Jenny

## 2021-05-29 NOTE — TELEPHONE ENCOUNTER
Spoke to radiology scheduling, unfortunately we could not keep patient's appointment on the same day and time. Called patient to inform him of this change but received his VM. Left him a detailed VM and asked him to call back to confirm he received my VM. Gave him radiology scheduling's number as well if the new date and/or time does not work well for him.

## 2021-05-30 VITALS — WEIGHT: 226 LBS | BODY MASS INDEX: 30.61 KG/M2 | HEIGHT: 72 IN

## 2021-05-30 VITALS — BODY MASS INDEX: 29.29 KG/M2 | WEIGHT: 216 LBS

## 2021-05-30 VITALS — WEIGHT: 221.7 LBS

## 2021-05-31 ENCOUNTER — RECORDS - HEALTHEAST (OUTPATIENT)
Dept: ADMINISTRATIVE | Facility: CLINIC | Age: 61
End: 2021-05-31

## 2021-05-31 ENCOUNTER — COMMUNICATION - HEALTHEAST (OUTPATIENT)
Dept: FAMILY MEDICINE | Facility: CLINIC | Age: 61
End: 2021-05-31

## 2021-05-31 VITALS — BODY MASS INDEX: 30.66 KG/M2 | HEIGHT: 71 IN | WEIGHT: 219 LBS

## 2021-05-31 DIAGNOSIS — I10 ESSENTIAL HYPERTENSION: ICD-10-CM

## 2021-05-31 NOTE — PROGRESS NOTES
[facility] Consultation Note    Thank you, Hardik Garcia MD, for asking the [facility] team to see Obdulio Ambrocio in consultation today at our clinic to evaluate abnormal coronary CT angiogram.      Assessment:   1.  Coronary artery disease asymptomatic  2.  Essential hypertension  3.  Hyperlipidemia recently started on statin therapy     Plan:   Due to moderate left main coronary artery stenosis suggested on his coronary CT angiogram will obtain a stress echo in the near future to exclude a hemodynamically significant blockage.  Due to left main disease would consider periodic stress testing every other year.  In any symptoms of classic angina in the future would consider proceeding with conventional angiography angiography.  Would aggressively treat his hyperlipidemia to obtain LDL cholesterols in the 70 range.  I have encouraged a regular exercise program            Current History:   59-year-old male with no previous cardiac history.  Several of the patient's coworkers were recently found to have coronary artery disease and subsequently a coronary CT angiogram with a calcium score was obtained on Krystle 3.  Patient had a calcium score of 186.  He had a 40 to 60% left main stenosis suggested with 25 to 50% stenosis in both his proximal circumflex and proximal left anterior descending artery with minimal right coronary artery stenosis suggested.  Patient does not exercise on any type of regular basis but I could not elicit any anginal type symptoms    Past Medical History:     Past Medical History:   Diagnosis Date     Asthma      Hyperlipidemia      Hypertension        Past Surgical History:   No past surgical history on file.  Tonsillectomy during childhood    Family History:   No family history on file.  Father is alive at age 88 with a previous myocardial infarction.  Mother  age 78 of leukemia.  He has 3 younger brothers and 4 sisters none of whom have known coronary  "artery disease    Social History:    reports that he has never smoked. He has never used smokeless tobacco. He reports that he drinks about 3.0 - 3.6 oz of alcohol per week. He reports that he does not use drugs.    Meds:   Scheduled Meds:  PRN Meds:.    Allergies:   Grass and Pollen    Review of Systems:   General: WNL  Eyes: WNL  Ears/Nose/Throat: WNL  Lungs: WNL  Heart: WNL  Stomach: WNL  Bladder: WNL  Muscle/Joints: Joint Pain  Skin: WNL  Nervous System: WNL  Mental Health: WNL     Blood: WNL      Objective:      Physical Exam  211 lb (95.7 kg)  5' 10.5\" (1.791 m)  Body mass index is 29.85 kg/m .  /70 (Patient Site: Right Arm, Patient Position: Sitting, Cuff Size: Adult Large)   Pulse 72   Resp 14   Ht 5' 10.5\" (1.791 m)   Wt 211 lb (95.7 kg)   BMI 29.85 kg/m      General Appearance:   alert, no apparent distress   HEENT:  no scleral icterus; the mucous membranes were pink and moist                                  Neck: jugular venous pressure is normal, no thyromegaly   Chest: the spine was straight and the chest was symmetric   Lungs:   respirations unlabored; the lungs are clear to auscultation   Cardiovascular:   regular rhythm with normal first and second heart sounds and no murmurs, clicks, or gallops. Carotid, radial, femoral, and posterior tibial pulses are intact; there are no carotid or femoral bruits.   Abdomen:  no organomegaly, masses, bruits, or tenderness; bowel sounds are present   Extremities: no cyanosis, clubbing, or edema   Skin: no xanthelasma   Neurologic: mood and affect are appropriate         Coronary CT angiogram health with calcium score from Krystle 3, 2019 results reviewed          Lab Review   Lab Results   Component Value Date     05/31/2019    K 4.8 05/31/2019     05/31/2019    CO2 24 05/31/2019    BUN 15 05/31/2019    CREATININE 1.11 05/31/2019    CALCIUM 10.4 05/31/2019     Lab Results   Component Value Date    WBC 4.6 05/31/2019    HGB 15.2 05/31/2019    " HCT 44.8 05/31/2019    MCV 92 05/31/2019     05/31/2019     Lab Results   Component Value Date    CHOL 252 (H) 05/31/2019    TRIG 129 05/31/2019    HDL 52 05/31/2019         Bubba Fregoso M.D., F.A.C.C.  [facility]  [phone]

## 2021-05-31 NOTE — TELEPHONE ENCOUNTER
Refill Approved    Rx renewed per Medication Renewal Policy. Medication was last renewed on 5/31/19.    Rayna Escalante, [facility] Triage/Med Refill 8/1/2019     Requested Prescriptions   Pending Prescriptions Disp Refills     lisinopril (PRINIVIL,ZESTRIL) 10 MG tablet [Pharmacy Med Name: LISINOPRIL TABS 10MG] 90 tablet 4     Sig: TAKE 1 TABLET DAILY (SCHEDULE FASTING OFFICE VISIT WITH DR RODRIGUEZ BEFORE FURTHER REFILLS)       Ace Inhibitors Refill Protocol Passed - 8/1/2019  3:01 PM        Passed - PCP or prescribing provider visit in past 12 months       Last office visit with prescriber/PCP: 2/2/2017 Hardik Rodriguez MD OR same dept: Visit date not found OR same specialty: 6/25/2018 Greta Goldman, VITA  Last physical: 5/31/2019 Last MTM visit: Visit date not found   Next visit within 3 mo: Visit date not found  Next physical within 3 mo: Visit date not found  Prescriber OR PCP: Hardik Rodriguez MD  Last diagnosis associated with med order: 1. Essential hypertension  - lisinopril (PRINIVIL,ZESTRIL) 10 MG tablet [Pharmacy Med Name: LISINOPRIL TABS 10MG]; TAKE 1 TABLET DAILY (SCHEDULE FASTING OFFICE VISIT WITH DR RODRIGUEZ BEFORE FURTHER REFILLS)  Dispense: 90 tablet; Refill: 4    If protocol passes may refill for 12 months if within 3 months of last provider visit (or a total of 15 months).             Passed - Serum Potassium in past 12 months     Lab Results   Component Value Date    Potassium 4.8 05/31/2019             Passed - Blood pressure filed in past 12 months     BP Readings from Last 1 Encounters:   06/27/19 137/81             Passed - Serum Creatinine in past 12 months     Creatinine   Date Value Ref Range Status   05/31/2019 1.11 0.70 - 1.30 mg/dL Final

## 2021-06-01 VITALS — WEIGHT: 225.1 LBS | BODY MASS INDEX: 30.49 KG/M2 | HEIGHT: 72 IN

## 2021-06-01 VITALS — BODY MASS INDEX: 32.28 KG/M2 | WEIGHT: 228.19 LBS

## 2021-06-02 NOTE — TELEPHONE ENCOUNTER
Requests refill of Atorvastatin 20mg  Last OV 5/31/19  Last Refill 5/31/19  [facility]

## 2021-06-02 NOTE — TELEPHONE ENCOUNTER
Refill Approved    Rx renewed per Medication Renewal Policy. Medication was last renewed on 5/31/19.    Colleen Calero, [facility] [place] [facility] Triage/Med Refill 10/22/2019     Requested Prescriptions   Pending Prescriptions Disp Refills     atorvastatin (LIPITOR) 20 MG tablet 90 tablet 3     Sig: Take 1 tablet (20 mg total) by mouth at bedtime.       Statins Refill Protocol (Hmg CoA Reductase Inhibitors) Passed - 10/22/2019  5:33 AM        Passed - PCP or prescribing provider visit in past 12 months      Last office visit with prescriber/PCP: Visit date not found OR same dept: Visit date not found OR same specialty: Visit date not found  Last physical: Visit date not found Last MTM visit: Visit date not found   Next visit within 3 mo: Visit date not found  Next physical within 3 mo: Visit date not found  Prescriber OR PCP: Colleen Calero RN  Last diagnosis associated with med order: 1. Essential hypertension  - atorvastatin (LIPITOR) 20 MG tablet; Take 1 tablet (20 mg total) by mouth at bedtime.  Dispense: 90 tablet; Refill: 3    If protocol passes may refill for 12 months if within 3 months of last provider visit (or a total of 15 months).

## 2021-06-03 VITALS — WEIGHT: 211 LBS | HEIGHT: 71 IN | BODY MASS INDEX: 29.54 KG/M2

## 2021-06-03 VITALS — HEIGHT: 71 IN | BODY MASS INDEX: 30.52 KG/M2 | WEIGHT: 218 LBS

## 2021-06-03 VITALS — WEIGHT: 218 LBS | BODY MASS INDEX: 30.52 KG/M2 | HEIGHT: 71 IN

## 2021-06-04 VITALS
SYSTOLIC BLOOD PRESSURE: 120 MMHG | OXYGEN SATURATION: 98 % | BODY MASS INDEX: 31.26 KG/M2 | WEIGHT: 221 LBS | HEART RATE: 70 BPM | DIASTOLIC BLOOD PRESSURE: 60 MMHG

## 2021-06-04 NOTE — PROGRESS NOTES
Assessment/Plan:    1. Chronic pain of right knee  Described right knee pain.  Did have bilateral knee x-rays March 27, 2014 that appeared unremarkable.  Posterior thigh and posterior knee pain may represent lumbar radiculopathy/sciatica.  Medrol Dosepak was provided.  Did refer patient to orthopedic specialist as well with potential concern for patellofemoral component.  - Ambulatory referral to Orthopedic Surgery    2. Need for vaccination  Shingrix immunization provided.  Will provide booster at physical exam in next 3 months.  - Varicella Zoster, Recombinant Vaccine IM    3. Essential hypertension  Continue management of hypertension with lisinopril 10 mg daily.  - atorvastatin (LIPITOR) 40 MG tablet; Take 1 tablet (40 mg total) by mouth daily.  Dispense: 90 tablet; Refill: 3    4. Hyperlipidemia, unspecified hyperlipidemia type  Continues atorvastatin 20 mg daily for lipid management.  Lipid cascade obtained today.  - Lipid Cascade    5. Impaired fasting glucose  Fasting glucose and A1c pending.  - Glycosylated Hemoglobin A1c  - Comprehensive Metabolic Panel    6. Chronic gout with tophus, unspecified cause, unspecified site  Ensure stable renal function as well as uric acid with goal less than 6 ideally.  Prior uric acid level 8.2 May 31, 2019.  - Uric Acid  - Comprehensive Metabolic Panel    7. Sciatica of right side  Medrol Dosepak as prescribed.  Further evaluation with consideration for lumbar MRI to rule out disc herniation etc. versus other.  Consideration for spine care referral.  - methylPREDNISolone (MEDROL DOSEPACK) 4 mg tablet; Take 1 tablet (4 mg total) by mouth daily for 6 days. Follow package directions  Dispense: 21 tablet; Refill: 0    Asthma control test 24 out of 25.      Subjective:    Obdulio Ambrocio is seen today for leg pain more on the right side.  Describes right knee pain initially however does describe some posterior thigh and posterior knee discomfort to upper calf.  No swelling.  No  history of gout.  Worse with sitting.  Does pull up into buttocks.  No history of herniated disc or sciatica described.  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  Lisinopril 10 mg daily for hypertension atorvastatin 20 mg daily for lipid management.  History of impaired fasting glucose.  Prior A1c of 5.8%.  Tries to stay active.  Symbicort 160/4.5 using 2 puffs twice daily for asthma.  3 pound weight gain since May 31, 2019.  Prior inflammatory arthritis work-up May 31, 2019 appeared unremarkable.  Hepatitis C antibody positive however quantitative RNA negative on further evaluation.  Uric acid 8.2 at that time.  Sed rate 5 with CRP 0.2.  Comprehensive review of systems as above otherwise all negative.    No past surgical history on file.     No family history on file.     Past Medical History:   Diagnosis Date     Asthma      Hyperlipidemia      Hypertension         Social History     Tobacco Use     Smoking status: Never Smoker     Smokeless tobacco: Never Used   Substance Use Topics     Alcohol use: Yes     Alcohol/week: 5.0 - 6.0 standard drinks     Types: 5 - 6 Standard drinks or equivalent per week     Drug use: No        Current Outpatient Medications   Medication Sig Dispense Refill     albuterol (PROAIR HFA;PROVENTIL HFA;VENTOLIN HFA) 90 mcg/actuation inhaler Inhale 2 puffs every 4 (four) hours as needed for wheezing or shortness of breath. 3 Inhaler 3     atorvastatin (LIPITOR) 40 MG tablet Take 1 tablet (40 mg total) by mouth daily. 90 tablet 3     budesonide-formoterol (SYMBICORT) 160-4.5 mcg/actuation inhaler Inhale 2 puffs 2 (two) times a day. 3 Inhaler 3     clotrimazole-betamethasone (LOTRISONE) cream apply topically sparingly to affected areas twice daily as needed 45 g 2     colchicine (COLCRYS) 0.6 mg tablet Take 2 tablets by mouth at onset of gout symptoms then 1 tablet by mouth 1 hour later x 1 30 tablet 2     fluticasone propionate (FLONASE) 50 mcg/actuation  nasal spray 2 sprays into each nostril daily. 48 g 3     lisinopril (PRINIVIL,ZESTRIL) 10 MG tablet TAKE 1 TABLET DAILY 90 tablet 2     metroNIDAZOLE (METROGEL) 1 % gel apply topically to affected areas twice daily for rosacea management 180 g 3     olopatadine (PATANOL) 0.1 % ophthalmic solution one drop both eyes twice daily 15 mL 3     methylPREDNISolone (MEDROL DOSEPACK) 4 mg tablet Take 1 tablet (4 mg total) by mouth daily for 6 days. Follow package directions 21 tablet 0     No current facility-administered medications for this visit.           Objective:    Vitals:    12/06/19 1447   BP: 120/60   Pulse: 70   SpO2: 98%   Weight: 221 lb (100.2 kg)      Body mass index is 31.26 kg/m .    Alert.  No apparent distress.  Right knee without swelling or significant osteoarthritic change.  No calf tenderness.  Minimal tenderness posterior right knee.  Equivocal straight leg raise otherwise DTRs appear symmetric at patella and Achilles bilateral.  No rash.      CTA coronary with calcium 6/27/19 Interpretation Summary       The total Agatston calcium score is 168. A calcium score in this range places the individual in the 75th percentile when compared to an age and gender matched control group and implies a high risk of cardiac events in the next ten years.    Plaque within the left main coronary artery with an estimate luminal stenosis that appears mild to moderate in severity. Estimate luminal stenosis of 40 to 60%. Not obviously flow-limiting.    Mild disease in the ostial portion of the circumflex with an estimate luminal stenosis of 25 to 49%.    Mild disease in the left anterior descending with an estimate luminal stenosis of 25 to 49% in its proximal portion    Given the findings in the left main consideration should be given to additional testing possibly stress echocardiogram or stress nuclear examination to further evaluate for ischemia.    Please see separate report per radiology for additional findings.               This note has been dictated using voice recognition software and as a result may contain minor grammatical errors and unintended word substitutions.

## 2021-06-05 VITALS
BODY MASS INDEX: 30.94 KG/M2 | HEIGHT: 71 IN | DIASTOLIC BLOOD PRESSURE: 60 MMHG | WEIGHT: 221 LBS | SYSTOLIC BLOOD PRESSURE: 118 MMHG | HEART RATE: 68 BPM | RESPIRATION RATE: 14 BRPM

## 2021-06-05 NOTE — TELEPHONE ENCOUNTER
Medication Question or Clarification  Who is calling: Patient  What medication are you calling about (include dose and sig)?:   metroNIDAZOLE (METROGEL) 1 % gel 180 g 3 5/31/2019     Sig: apply topically to affected areas twice daily for rosacea management    Sent to pharmacy as: metroNIDAZOLE (METROGEL) 1 % gel    E-Prescribing Status: Receipt confirmed by pharmacy (5/31/2019 12:44 PM CDT)        Who prescribed the medication?: Hardik Mg MD  What is your question/concern?: as listed in message this medication is not effective any more please send a stronger medication or an alternative.  Also I am requesting a 30 day supply to try prior to filling this for 90 day with my mail order pharmacy.   Requested Pharmacy: [facility]  Okay to leave a detailed message?: Yes

## 2021-06-07 NOTE — TELEPHONE ENCOUNTER
Refill Approved    Rx renewed per Medication Renewal Policy. Medication was last renewed on 8/1/19.    Dominique Gamboa, [facility] Triage/Med Refill 4/28/2020     Requested Prescriptions   Pending Prescriptions Disp Refills     lisinopriL (PRINIVIL,ZESTRIL) 10 MG tablet [Pharmacy Med Name: LISINOPRIL TABS 10MG] 90 tablet 3     Sig: TAKE 1 TABLET DAILY       Ace Inhibitors Refill Protocol Passed - 4/27/2020  5:25 AM        Passed - PCP or prescribing provider visit in past 12 months       Last office visit with prescriber/PCP: 12/6/2019 Hardik Mg MD OR same dept: 12/6/2019 Hardik Mg MD OR same specialty: 12/6/2019 Hardik Mg MD  Last physical: 5/31/2019 Last MTM visit: Visit date not found   Next visit within 3 mo: Visit date not found  Next physical within 3 mo: Visit date not found  Prescriber OR PCP: Hardik Mg MD  Last diagnosis associated with med order: 1. Essential hypertension  - lisinopriL (PRINIVIL,ZESTRIL) 10 MG tablet [Pharmacy Med Name: LISINOPRIL TABS 10MG]; TAKE 1 TABLET DAILY  Dispense: 90 tablet; Refill: 3    If protocol passes may refill for 12 months if within 3 months of last provider visit (or a total of 15 months).             Passed - Serum Potassium in past 12 months     Lab Results   Component Value Date    Potassium 4.5 12/06/2019             Passed - Blood pressure filed in past 12 months     BP Readings from Last 1 Encounters:   12/06/19 120/60             Passed - Serum Creatinine in past 12 months     Creatinine   Date Value Ref Range Status   12/06/2019 1.00 0.70 - 1.30 mg/dL Final

## 2021-06-08 NOTE — PROGRESS NOTES
"Subjective:    Obdulio Ambrocio is seen today for follow-up evaluation of hypertension.  Recent office visit Gabrielle 23rd 2017 started on lisinopril 10 mg daily for suboptimal blood pressure control.  Blood pressure 170/100 at the [facility].  Tolerating medication without side effect.  No dizziness.  No cough.  History of hyperlipidemia.  Recent renal function normal.  Patient is fasting today.  Needs refill on Symbicort which she ran out of.  Using 2 puffs twice daily typically.  Also needs refill on Patanol ophthalmic drop 1 drop both eyes twice daily for allergic conjunctivitis typically worse in the spring and fall.  Has not had pneumonia shot.  States shot last fall.     \"Daphne\"   2 daughters - asthma   Construction management - escalator work, prior elevator work   No smoke   Occ EtOH   Mom -   Dad - HTN   2 bros -   4 sis -   No surgeries/hospitalizations   Tdap 1/8/08 4/1/05 - cholesterol 211, HDL39   Peak Flow Max = 650     History reviewed. No pertinent surgical history.     History reviewed. No pertinent family history.     Past Medical History:   Diagnosis Date     Hypertension         Social History   Substance Use Topics     Smoking status: Never Smoker     Smokeless tobacco: None     Alcohol use None        Current Outpatient Prescriptions   Medication Sig Dispense Refill     albuterol (PROVENTIL HFA;VENTOLIN HFA) 90 mcg/actuation inhaler Inhale 2 puffs every 4 (four) hours as needed for wheezing or shortness of breath. 3 Inhaler 0     lisinopril (PRINIVIL,ZESTRIL) 10 MG tablet Take 1 tablet (10 mg total) by mouth daily. 90 tablet 3     budesonide-formoterol (SYMBICORT) 160-4.5 mcg/actuation inhaler Inhale 2 puffs 2 (two) times a day. 3 Inhaler 3     olopatadine (PATANOL) 0.1 % ophthalmic solution one drop both eyes twice daily 15 mL 3     No current facility-administered medications for this visit.           Objective:    Vitals:    02/02/17 1452 02/02/17 1532   BP: 110/70 118/80   Pulse: " 80    Weight: 216 lb (98 kg)       Body mass index is 29.29 kg/(m^2).    Alert.  No apparent distress.  Chest clear.  Cardiac exam regular.  Eyes without current conjunctivitis issues.  Blood pressure recheck 118/80.  Extremities warm and dry.      Assessment:    1. Essential hypertension  lisinopril (PRINIVIL,ZESTRIL) 10 MG tablet    Basic Metabolic Panel   2. Overweight (BMI 25.0-29.9)     3. Mild persistent asthma with acute exacerbation  budesonide-formoterol (SYMBICORT) 160-4.5 mcg/actuation inhaler    Pneumococcal polysaccharide vaccine 23-valent 1 yo or older, subq/IM   4. Hyperlipidemia, unspecified hyperlipidemia type  Lipid Cascade   5. Allergic conjunctivitis  olopatadine (PATANOL) 0.1 % ophthalmic solution         Plan:    Continue lisinopril 10 mg daily with 90 day supply with refill sent to mail order pharmacy.  Check basic metabolic panel today for fasting glucose as well as ensure stable renal function on ACE inhibitor.  Weight goal less than 210 pounds initially, less than 200 pounds ideally.  Pneumovax immunization provided with history of asthma.  Refill provided for Symbicort.  Patanol ophthalmic drops for allergic conjunctivitis management typically worse in the spring and fall.  Check lipid cascade today with history of hyperlipidemia with dietary and exercise modifications as noted above.  Anticipate follow-up physical exam within 6 months.

## 2021-06-08 NOTE — PROGRESS NOTES
Obdulio is a 56 y.o. male presenting to the clinic for concerns for hypertension.  Patient presented to the walk-in clinic on 1/20/17 for conjunctivitis.  His blood pressure was noted to be 158/94.  He presented to the [facility] on Saturday and Sunday where his blood pressure was 170/100.  He denies family history of hypertension.  He has been feeling well and denies headache, blurry vision, chest pain, shortness of breath with exertion, edema, orthopnea, syncope, and palpitations.  He has not had any new stressors.  He denies smoking or history of smoking.  He does consume 2 cans of soda per day.    Review of Systems: A complete 14 point review of systems was obtained and is negative or as stated in the history of present illness.    Social History     Social History     Marital status:      Spouse name: N/A     Number of children: N/A     Years of education: N/A     Occupational History     Not on file.     Social History Main Topics     Smoking status: Never Smoker     Smokeless tobacco: Not on file     Alcohol use Not on file     Drug use: Not on file     Sexual activity: Not on file     Other Topics Concern     Not on file     Social History Narrative     No narrative on file       Active Ambulatory Problems     Diagnosis Date Noted     Male Erectile Disorder      Hay Fever      Itching (Pruritus)      Esophageal Reflux      Joint Pain, Localized In The Right Shoulder      Patellofemoral Syndrome      Hyperlipidemia      Mild Persistent Asthma      Impaired Fasting Glucose      Resolved Ambulatory Problems     Diagnosis Date Noted     No Resolved Ambulatory Problems     No Additional Past Medical History       No family history on file.    OBJECTIVE:     Visit Vitals     /88 (Patient Site: Left Arm, Patient Position: Sitting, Cuff Size: Adult Large)     Pulse 66     Temp 97.7  F (36.5  C)     Ht 6' (1.829 m)     Wt (!) 226 lb (102.5 kg)     SpO2 98%     BMI 30.65 kg/m2       Patient is alert, in no  obvious distress.   Skin: Warm, dry.  Neck: Supple, no lymphadenopathy, JVD, bruits noted.  No thyromegaly.  Lungs:  Clear to auscultation. Respirations even and unlabored.  No wheezing or rales noted.   Heart:  Regular rate and rhythm.  No murmurs.   Abdomen: Soft, nontender.  No organomegaly. Bowel sounds normoactive. No guarding or masses noted. No bruits noted.   Musculoskeletal:  No edema present in bilateral lower extremities.      LABORATORY:I ordered and personally reviewed an EKG showing normal sinus rhythm, left anterior fascicular block.  I reviewed the EKG with Dr. Winters     Urinalysis and bmp ordered.  Urinalysis is unremarkable.     ASSESSMENT AND PLAN:     1. Hypertension  Discussed treatment options.  We'll start lisinopril 10 mg daily.  Discussed avoidance of sodium.  Recommend follow-up appointment and blood pressure recheck with Dr. Mg in 1-2 weeks.  I recommend he present to the clinic fasting for cholesterol check at that time.  He is content with the plan.  - Electrocardiogram Perform and Read  - Urinalysis  - Basic Metabolic Panel  - lisinopril (PRINIVIL,ZESTRIL) 10 MG tablet; Take 1 tablet (10 mg total) by mouth daily.  Dispense: 30 tablet; Refill: 0

## 2021-06-09 ENCOUNTER — OFFICE VISIT - HEALTHEAST (OUTPATIENT)
Dept: FAMILY MEDICINE | Facility: CLINIC | Age: 61
End: 2021-06-09

## 2021-06-09 DIAGNOSIS — E78.5 HYPERLIPIDEMIA, UNSPECIFIED HYPERLIPIDEMIA TYPE: ICD-10-CM

## 2021-06-09 DIAGNOSIS — M1A.9XX1 CHRONIC GOUT WITH TOPHUS, UNSPECIFIED CAUSE, UNSPECIFIED SITE: ICD-10-CM

## 2021-06-09 DIAGNOSIS — E66.811 CLASS 1 OBESITY DUE TO EXCESS CALORIES WITH SERIOUS COMORBIDITY AND BODY MASS INDEX (BMI) OF 31.0 TO 31.9 IN ADULT: ICD-10-CM

## 2021-06-09 DIAGNOSIS — E66.09 CLASS 1 OBESITY DUE TO EXCESS CALORIES WITH SERIOUS COMORBIDITY AND BODY MASS INDEX (BMI) OF 31.0 TO 31.9 IN ADULT: ICD-10-CM

## 2021-06-09 DIAGNOSIS — M76.62 ACHILLES TENDINITIS OF LEFT LOWER EXTREMITY: ICD-10-CM

## 2021-06-09 DIAGNOSIS — I10 ESSENTIAL HYPERTENSION: ICD-10-CM

## 2021-06-09 DIAGNOSIS — M10.9 ACUTE GOUT, UNSPECIFIED CAUSE, UNSPECIFIED SITE: ICD-10-CM

## 2021-06-09 DIAGNOSIS — R73.01 IMPAIRED FASTING GLUCOSE: ICD-10-CM

## 2021-06-09 DIAGNOSIS — Z00.00 ROUTINE GENERAL MEDICAL EXAMINATION AT A HEALTH CARE FACILITY: ICD-10-CM

## 2021-06-09 DIAGNOSIS — H10.13 ALLERGIC CONJUNCTIVITIS OF BOTH EYES: ICD-10-CM

## 2021-06-09 DIAGNOSIS — J30.9 AR (ALLERGIC RHINITIS): ICD-10-CM

## 2021-06-09 DIAGNOSIS — J45.31 MILD PERSISTENT ASTHMA WITH ACUTE EXACERBATION: ICD-10-CM

## 2021-06-09 RX ORDER — ATORVASTATIN CALCIUM 40 MG/1
40 TABLET, FILM COATED ORAL DAILY
Qty: 90 TABLET | Refills: 3 | Status: SHIPPED | OUTPATIENT
Start: 2021-06-09 | End: 2022-06-17

## 2021-06-09 RX ORDER — INDOMETHACIN 50 MG/1
CAPSULE ORAL
Qty: 60 CAPSULE | Refills: 3 | Status: SHIPPED | OUTPATIENT
Start: 2021-06-09

## 2021-06-09 RX ORDER — BUDESONIDE AND FORMOTEROL FUMARATE DIHYDRATE 160; 4.5 UG/1; UG/1
2 AEROSOL RESPIRATORY (INHALATION) 2 TIMES DAILY
Qty: 3 INHALER | Refills: 3 | Status: SHIPPED | OUTPATIENT
Start: 2021-06-09 | End: 2022-12-27

## 2021-06-09 RX ORDER — LISINOPRIL 10 MG/1
TABLET ORAL
Qty: 90 TABLET | Refills: 3 | Status: SHIPPED | OUTPATIENT
Start: 2021-06-09 | End: 2022-06-17

## 2021-06-09 RX ORDER — OLOPATADINE HYDROCHLORIDE 1 MG/ML
SOLUTION/ DROPS OPHTHALMIC
Qty: 15 ML | Refills: 3 | Status: SHIPPED | OUTPATIENT
Start: 2021-06-09 | End: 2022-10-14

## 2021-06-09 ASSESSMENT — MIFFLIN-ST. JEOR: SCORE: 1822.1

## 2021-06-09 NOTE — TELEPHONE ENCOUNTER
Refill Approved    Rx renewed per Medication Renewal Policy. Medication was last renewed on 5/31/19.    Dominique Gamboa, [facility] Connection Triage/Med Refill 6/18/2020     Requested Prescriptions   Pending Prescriptions Disp Refills     olopatadine (PATANOL) 0.1 % ophthalmic solution [Pharmacy Med Name: OLOPATADINE OPTH SOLN 5ML 0.1%] 15 mL 3     Sig: INSTILL 1 DROP IN BOTH EYES TWICE A DAY       Opthalmic Allergy Drops Refill Protocol Passed - 6/17/2020  3:56 PM        Passed - Patient has had office visit/physical in last 12 months     Last office visit with prescriber/PCP: 12/6/2019 Hardik Mg MD OR same dept: 12/6/2019 Hardik Mg MD OR same specialty: 12/6/2019 Hardik Mg MD  Last physical: 5/31/2019 Last MTM visit: Visit date not found   Next visit within 3 mo: Visit date not found  Next physical within 3 mo: Visit date not found  Prescriber OR PCP: Hardik Mg MD  Last diagnosis associated with med order: 1. Allergic conjunctivitis of both eyes  - olopatadine (PATANOL) 0.1 % ophthalmic solution [Pharmacy Med Name: OLOPATADINE OPTH SOLN 5ML 0.1%]; INSTILL 1 DROP IN BOTH EYES TWICE A DAY  Dispense: 15 mL; Refill: 3    2. AR (allergic rhinitis)  - fluticasone propionate (FLONASE) 50 mcg/actuation nasal spray [Pharmacy Med Name: FLUTICASONE FL NS SP 16GM RX 50MCG]; USE 2 SPRAYS IN EACH NOSTRIL DAILY  Dispense: 48 g; Refill: 3    If protocol passes may refill for 12 months if within 3 months of last provider visit (or a total of 15 months).                fluticasone propionate (FLONASE) 50 mcg/actuation nasal spray [Pharmacy Med Name: FLUTICASONE FL NS SP 16GM RX 50MCG] 48 g 3     Sig: USE 2 SPRAYS IN EACH NOSTRIL DAILY       Nasal Steroid Refill Protocol Passed - 6/17/2020  3:56 PM        Passed - Patient has had office visit/physical in last 2 years     Last office visit with prescriber/PCP: 12/6/2019 OR same dept: 12/6/2019 Hardik Mg MD OR same specialty: 12/6/2019 Hardik Mg,  MD Last physical: 5/31/2019 Last MTM visit: Visit date not found    Next appt within 3 mo: Visit date not found  Next physical within 3 mo: Visit date not found  Prescriber OR PCP: Hardik Mg MD  Last diagnosis associated with med order: 1. Allergic conjunctivitis of both eyes  - olopatadine (PATANOL) 0.1 % ophthalmic solution [Pharmacy Med Name: OLOPATADINE OPTH SOLN 5ML 0.1%]; INSTILL 1 DROP IN BOTH EYES TWICE A DAY  Dispense: 15 mL; Refill: 3    2. AR (allergic rhinitis)  - fluticasone propionate (FLONASE) 50 mcg/actuation nasal spray [Pharmacy Med Name: FLUTICASONE GA NS SP 16GM RX 50MCG]; USE 2 SPRAYS IN EACH NOSTRIL DAILY  Dispense: 48 g; Refill: 3     If protocol passes may refill for 12 months if within 3 months of last provider visit (or a total of 15 months).

## 2021-06-10 NOTE — TELEPHONE ENCOUNTER
Refill Approved    Rx renewed per Medication Renewal Policy. Medication was last renewed on 12/6/19, last OV 12/6/19.    Janet Chappell, [facility] Triage/Med Refill 8/16/2020     Requested Prescriptions   Pending Prescriptions Disp Refills     atorvastatin (LIPITOR) 20 MG tablet [Pharmacy Med Name: ATORVASTATIN TABS 20MG] 90 tablet 3     Sig: TAKE 1 TABLET AT BEDTIME       Statins Refill Protocol (Hmg CoA Reductase Inhibitors) Passed - 8/14/2020  5:57 PM        Passed - PCP or prescribing provider visit in past 12 months      Last office visit with prescriber/PCP: 12/6/2019 Hardik Mg MD OR same dept: 12/6/2019 Hardik Mg MD OR same specialty: 12/6/2019 Hardik Mg MD  Last physical: 5/31/2019 Last MTM visit: Visit date not found   Next visit within 3 mo: Visit date not found  Next physical within 3 mo: Visit date not found  Prescriber OR PCP: Hardik Mg MD  Last diagnosis associated with med order: There are no diagnoses linked to this encounter.  If protocol passes may refill for 12 months if within 3 months of last provider visit (or a total of 15 months).

## 2021-06-13 NOTE — TELEPHONE ENCOUNTER
Better to get it now! Most of the immunity comes from the first shot, but would be best to complete the series. Even though it has been over 6 months, he does not need to start the series over. Rather, I would recommend he gets the second dose as soon as possible.

## 2021-06-13 NOTE — TELEPHONE ENCOUNTER
Patient Returning Call  Reason for call: Caller returning call to the clinic.  Information relayed to patient:  Yes as instructed and caller agrees with plan.  Was transferred to schedule future appointment today.  Patient has additional questions:  No  If YES, what are your questions/concerns:  none  Okay to leave a detailed message?: Yes

## 2021-06-13 NOTE — TELEPHONE ENCOUNTER
Left message to call back for: immunization question - Shingrix  Information to relay to patient:  Below message. Please help arrange a nurse only appointment for patient to receive his second shingrix vaccine ASAP.

## 2021-06-13 NOTE — TELEPHONE ENCOUNTER
Alexandra if he were to get the second Shingrix vaccine now would it still be as effective if he were to get it within the 2-6 month time frame?

## 2021-06-13 NOTE — TELEPHONE ENCOUNTER
Who is calling:  Patient  Reason for Call:  The patient would like to know what they should do about the second dose of their shingles vaccine. The patient had the first dose over a year ago. Please call the patient to discuss what their options are.  Date of last appointment with primary care: 11/9/20  Okay to leave a detailed message: Yes

## 2021-06-14 ENCOUNTER — AMBULATORY - HEALTHEAST (OUTPATIENT)
Dept: LAB | Facility: CLINIC | Age: 61
End: 2021-06-14

## 2021-06-14 DIAGNOSIS — R73.01 IMPAIRED FASTING GLUCOSE: ICD-10-CM

## 2021-06-14 DIAGNOSIS — Z00.00 ROUTINE GENERAL MEDICAL EXAMINATION AT A HEALTH CARE FACILITY: ICD-10-CM

## 2021-06-14 DIAGNOSIS — M1A.9XX1 CHRONIC GOUT WITH TOPHUS, UNSPECIFIED CAUSE, UNSPECIFIED SITE: ICD-10-CM

## 2021-06-14 DIAGNOSIS — I10 ESSENTIAL HYPERTENSION: ICD-10-CM

## 2021-06-14 DIAGNOSIS — E78.5 HYPERLIPIDEMIA, UNSPECIFIED HYPERLIPIDEMIA TYPE: ICD-10-CM

## 2021-06-14 LAB
ANION GAP SERPL CALCULATED.3IONS-SCNC: 13 MMOL/L (ref 5–18)
BUN SERPL-MCNC: 13 MG/DL (ref 8–22)
CALCIUM SERPL-MCNC: 9.3 MG/DL (ref 8.5–10.5)
CHLORIDE BLD-SCNC: 102 MMOL/L (ref 98–107)
CHOLEST SERPL-MCNC: 161 MG/DL
CO2 SERPL-SCNC: 24 MMOL/L (ref 22–31)
CREAT SERPL-MCNC: 0.95 MG/DL (ref 0.7–1.3)
ERYTHROCYTE [DISTWIDTH] IN BLOOD BY AUTOMATED COUNT: 13.1 % (ref 11–14.5)
FASTING STATUS PATIENT QL REPORTED: YES
GFR SERPL CREATININE-BSD FRML MDRD: >60 ML/MIN/1.73M2
GLUCOSE BLD-MCNC: 97 MG/DL (ref 70–125)
HBA1C MFR BLD: 5.4 %
HCT VFR BLD AUTO: 38.7 % (ref 40–54)
HDLC SERPL-MCNC: 49 MG/DL
HGB BLD-MCNC: 14 G/DL (ref 14–18)
LDLC SERPL CALC-MCNC: 87 MG/DL
MCH RBC QN AUTO: 30.4 PG (ref 27–34)
MCHC RBC AUTO-ENTMCNC: 36.2 G/DL (ref 32–36)
MCV RBC AUTO: 84 FL (ref 80–100)
PLATELET # BLD AUTO: 219 THOU/UL (ref 140–440)
PMV BLD AUTO: 8.9 FL (ref 7–10)
POTASSIUM BLD-SCNC: 4.4 MMOL/L (ref 3.5–5)
PSA SERPL-MCNC: 0.9 NG/ML (ref 0–4.5)
RBC # BLD AUTO: 4.61 MILL/UL (ref 4.4–6.2)
SODIUM SERPL-SCNC: 139 MMOL/L (ref 136–145)
TRIGL SERPL-MCNC: 125 MG/DL
URATE SERPL-MCNC: 6.3 MG/DL (ref 3–8)
WBC: 5.4 THOU/UL (ref 4–11)

## 2021-06-15 PROBLEM — E66.3 OVERWEIGHT: Status: ACTIVE | Noted: 2017-02-02

## 2021-06-15 PROBLEM — J45.31 MILD PERSISTENT ASTHMA WITH ACUTE EXACERBATION: Status: ACTIVE | Noted: 2017-02-02

## 2021-06-15 PROBLEM — I10 ESSENTIAL HYPERTENSION: Status: ACTIVE | Noted: 2017-02-02

## 2021-06-15 NOTE — PROGRESS NOTES
Assessment:     1. Routine general medical examination at a health care facility  Td, Preservative Free (green label)   2. Class 1 obesity due to excess calories without serious comorbidity with body mass index (BMI) of 30.0 to 30.9 in adult     3. Mild persistent asthma with acute exacerbation  budesonide-formoterol (SYMBICORT) 160-4.5 mcg/actuation inhaler    albuterol (PROAIR HFA;PROVENTIL HFA;VENTOLIN HFA) 90 mcg/actuation inhaler   4. Hyperlipidemia, unspecified hyperlipidemia type  Lipid Cascade   5. Essential hypertension  Basic Metabolic Panel    lisinopril (PRINIVIL,ZESTRIL) 10 MG tablet   6. Dermatitis  clotrimazole-betamethasone (LOTRISONE) cream   7. Impaired fasting glucose  Basic Metabolic Panel   8. Allergic conjunctivitis of both eyes  olopatadine (PATANOL) 0.1 % ophthalmic solution   9. AR (allergic rhinitis)  fluticasone (FLONASE) 50 mcg/actuation nasal spray   10. Acne rosacea  metroNIDAZOLE (METROGEL) 1 % gel   11. Bilateral patellofemoral syndrome  Ambulatory referral to Orthopedic Surgery   12. Left foot pain          Plan:      Routine healthcare maintenance.  Preventative cares reviewed.  Tetanus booster given otherwise immunizations up-to-date.  Continue Symbicort 160/4.5 using 2 puffs twice daily.  Albuterol metered-dose inhaler as needed for breakthrough symptoms.  Continue lisinopril 10 mg daily for hypertension with improved control.  Check lipid cascade today for hyperlipidemia.  Basic metabolic panel fasting glucose medication monitoring.  Patanol ophthalmic solution for allergic conjunctivitis as well as fluticasone nasal spray for allergic rhinitis.  Annual physical exams to continue.  Recommend blood pressure recheck etc. in 6 months.  Asthma control test 20 out of 25    I have had an Advance Directives discussion with the patient.  The following high BMI interventions were performed this visit: encouragement to exercise, weight monitoring, weight loss from baseline weight and  "lifestyle education regarding diet.  Weight goal < 210 pounds initially, < 200 pounds ideally.     Multiple concerns in addition to physical including perianal dermatitis and pruritus.  Clotrimazole and betamethasone cream twice daily as needed provided.    Bilateral knee pain.  Prior MRI described.  Referral to orthopedic surgery for further evaluation and treatment options.  DM all muscle strengthening reviewed.    Left foot pain distal fifth metatarsal near fifth MTP joint.  Question of tailor's bunion.  Avoidance of exacerbating triggers per    Acne rosacea noted.  MetroGel 1% gel twice daily.  Avoid alcohol, hot liquids, caffeinated beverages etc.    Subjective:      Obdulio Ambrocio is a 57 y.o. male who presents for an annual exam.  Multiple questions.  Facial redness.  Left foot pain near fifth MTP joint lateral aspect without trauma.  Bilateral knee pain worse with stair climbing.  Has had described MRIs in the past of knees.  Denies chest pain or shortness of breath.  No recent illness.  Does have underlying persistent asthma and using Symbicort 160/4.5 using 2 puffs twice daily plus albuterol metered-dose inhaler with increased use during cold weather.  Lisinopril 10 mg daily for hypertension.  Wants refill on cream that he is used previously for some right external auditory canal dermatitis as well as perianal dermatitis.  Itchiness noted perianal region.  No blood in stools.  Prior colonoscopy January 23, 2013 with hyperplastic polyp ×2 told her repeat 10 year interval.  Needs tetanus booster today.  Comprehensive review of systems as above otherwise all negative.     \"Daphne\"   2 daughters - asthma   Construction management - escalator work, prior elevator work   No smoke   Occ EtOH   Mom -   Dad - HTN   2 bros -   4 sis -   No surgeries/hospitalizations   Tdap 1/8/08 4/1/05 - cholesterol 211, HDL39   Peak Flow Max = 650    Healthy Habits:   Regular Exercise: No  Healthy Diet: No  Dental Visits " Regularly: Yes  Seat Belt: Yes   Sexually active: Yes  Colonoscopy: Yes  Lipid Profile: Yes  Glucose Screen: Yes    Immunization History   Administered Date(s) Administered     Hep A, historic 11/18/2002, 04/01/2005     Influenza, inj, historic,unspecified 10/01/2016     Influenza, seasonal,quad inj 6-35 mos 09/18/2009     Influenza,seasonal quad, PF 12/27/2013     Pneumo Polysac 23-V 02/02/2017     Td,adult,historic,unspecified 06/10/1998     Tdap 01/08/2008     Immunization status: up to date and documented, needs Td booster today.  Vision Screening:both eyes  Hearing: PASS     Current Outpatient Prescriptions   Medication Sig Dispense Refill     albuterol (PROAIR HFA;PROVENTIL HFA;VENTOLIN HFA) 90 mcg/actuation inhaler Inhale 2 puffs every 4 (four) hours as needed for wheezing or shortness of breath. 3 Inhaler 0     budesonide-formoterol (SYMBICORT) 160-4.5 mcg/actuation inhaler Inhale 2 puffs 2 (two) times a day. 3 Inhaler 3     lisinopril (PRINIVIL,ZESTRIL) 10 MG tablet Take 1 tablet (10 mg total) by mouth daily. 90 tablet 3     olopatadine (PATANOL) 0.1 % ophthalmic solution one drop both eyes twice daily 15 mL 3     clotrimazole-betamethasone (LOTRISONE) cream apply topically sparingly to affected areas twice daily as needed 45 g 2     fluticasone (FLONASE) 50 mcg/actuation nasal spray 2 sprays into each nostril daily. 48 g 3     metroNIDAZOLE (METROGEL) 1 % gel apply topically to affected areas twice daily for rosacea management 180 g 3     No current facility-administered medications for this visit.      Past Medical History:   Diagnosis Date     Hypertension      History reviewed. No pertinent surgical history.  Grass and Pollen  History reviewed. No pertinent family history.  Social History     Social History     Marital status:      Spouse name: N/A     Number of children: N/A     Years of education: N/A     Occupational History     Not on file.     Social History Main Topics     Smoking status:  "Never Smoker     Smokeless tobacco: Not on file     Alcohol use Not on file     Drug use: Not on file     Sexual activity: Not on file     Other Topics Concern     Not on file     Social History Narrative       Review of Systems  Comprehensive ROS: as above, otherwise all negative.           Objective:     /78  Pulse 60  Ht 5' 10.5\" (1.791 m)  Wt 219 lb (99.3 kg)  BMI 30.98 kg/m2  Body mass index is 30.98 kg/(m^2).    Physical    General Appearance:    Alert, cooperative, no distress, appears stated age.  Obesity.   Head:    Normocephalic, without obvious abnormality, atraumatic   Eyes:    PERRL, conjunctiva/corneas clear, EOM's intact, fundi     benign, both eyes        Ears:    Normal TM's and external ear canals, both ears   Nose:   Nares normal, septum midline, mucosa normal, no drainage    or sinus tenderness   Throat:   Lips, mucosa, and tongue normal; teeth and gums normal   Neck:   Supple, symmetrical, trachea midline, no adenopathy;        thyroid:  No enlargement/tenderness/nodules; no carotid    bruit or JVD   Back:     Symmetric, no curvature, ROM normal, no CVA tenderness   Lungs:     Clear to auscultation bilaterally, respirations unlabored   Chest wall:    No tenderness or deformity   Heart:    Regular rate and rhythm, S1 and S2 normal, no murmur, rub   or gallop   Abdomen:     Soft, non-tender, bowel sounds active all four quadrants,     no masses, no organomegaly.     Genitalia:    Normal male without lesion, discharge or tenderness.  No inguinal hernia noted.     Rectal:    Normal tone.  Prostate normal/symmetric, no masses or tenderness.   Extremities:   Extremities normal, atraumatic, no cyanosis or edema   Pulses:   2+ and symmetric all extremities   Skin:   Skin color, texture, turgor normal, no rashes or lesions   Lymph nodes:   Cervical, supraclavicular, and axillary nodes normal   Neurologic:   CNII-XII intact. Normal strength, sensation and reflexes       throughout               "

## 2021-06-15 NOTE — TELEPHONE ENCOUNTER
Patient calling in this morning stating that he reached out to us last week requesting a refill of his Lisinopril. Let patient know that Dr. Mg refilled his Lisinopril on 2/26 sending to [facility]- patient states that they have not received that Rx. So he is requesting that it be sent in again along with a refill of Albuterol inhaler and a generic version for Symbicort as his insurance does not cover the brand name.

## 2021-06-15 NOTE — TELEPHONE ENCOUNTER
RN cannot approve Refill Request    RN can NOT refill this medication Protocol failed and NO refill given. Last office visit: 12/6/2019 Hardik Mg MD Last Physical: 5/31/2019 Last MTM visit: Visit date not found Last visit same specialty: 12/6/2019 Hardik Mg MD.  Next visit within 3 mo: Visit date not found  Next physical within 3 mo: Visit date not found      [facility] En, [facility] Triage/Med Refill 2/26/2021    Requested Prescriptions   Pending Prescriptions Disp Refills     lisinopriL (PRINIVIL,ZESTRIL) 10 MG tablet [Pharmacy Med Name: LISINOPRIL TABS 10MG] 90 tablet 3     Sig: TAKE 1 TABLET DAILY       Ace Inhibitors Refill Protocol Failed - 2/26/2021 10:35 AM        Failed - PCP or prescribing provider visit in past 12 months       Last office visit with prescriber/PCP: 12/6/2019 Hardik Mg MD OR same dept: Visit date not found OR same specialty: 12/6/2019 Hardik Mg MD  Last physical: 5/31/2019 Last MTM visit: Visit date not found   Next visit within 3 mo: Visit date not found  Next physical within 3 mo: Visit date not found  Prescriber OR PCP: Hardik Mg MD  Last diagnosis associated with med order: 1. Essential hypertension  - lisinopriL (PRINIVIL,ZESTRIL) 10 MG tablet [Pharmacy Med Name: LISINOPRIL TABS 10MG]; TAKE 1 TABLET DAILY  Dispense: 90 tablet; Refill: 3    If protocol passes may refill for 12 months if within 3 months of last provider visit (or a total of 15 months).             Failed - Serum Potassium in past 12 months     No results found for: LN-POTASSIUM          Failed - Serum Creatinine in past 12 months     Creatinine   Date Value Ref Range Status   12/06/2019 1.00 0.70 - 1.30 mg/dL Final             Passed - Blood pressure filed in past 12 months     BP Readings from Last 1 Encounters:   11/09/20 118/60

## 2021-06-16 PROBLEM — E66.811 CLASS 1 OBESITY DUE TO EXCESS CALORIES WITH SERIOUS COMORBIDITY AND BODY MASS INDEX (BMI) OF 30.0 TO 30.9 IN ADULT: Status: ACTIVE | Noted: 2019-05-31

## 2021-06-16 PROBLEM — E66.09 CLASS 1 OBESITY DUE TO EXCESS CALORIES WITH SERIOUS COMORBIDITY AND BODY MASS INDEX (BMI) OF 30.0 TO 30.9 IN ADULT: Status: ACTIVE | Noted: 2019-05-31

## 2021-06-16 PROBLEM — H10.13 ALLERGIC CONJUNCTIVITIS OF BOTH EYES: Status: ACTIVE | Noted: 2019-05-31

## 2021-06-16 PROBLEM — L71.9 ACNE ROSACEA: Status: ACTIVE | Noted: 2019-05-31

## 2021-06-16 PROBLEM — M10.9 GOUT FLARE: Status: ACTIVE | Noted: 2018-06-25

## 2021-06-16 PROBLEM — M1A.9XX1 CHRONIC GOUT WITH TOPHUS, UNSPECIFIED CAUSE, UNSPECIFIED SITE: Status: ACTIVE | Noted: 2019-05-31

## 2021-06-16 LAB — HIV 1+2 AB+HIV1 P24 AG SERPL QL IA: NEGATIVE

## 2021-06-16 NOTE — PROGRESS NOTES
Assessment/Plan:     1. Acute sinusitis         Diagnoses and all orders for this visit:    Acute sinusitis    -     amoxicillin-clavulanate (AUGMENTIN) 875-125 mg per tablet; Take 1 tablet by mouth 2 (two) times a day for 10 days.  Dispense: 20 tablet; Refill: 0--counseled on use of medication and common side effects  -Encouraged rest and fluids  -Suggest over-the-counter Mucinex and fluticasone nasal spray  -Follow-up if symptoms worsening, not improving or new concerning symptoms develop             Subjective:      Obdulio Ambrocio is a 57 y.o. male who comes in today with concern about possible sinus infection.  Symptoms started about 5 days ago.  Initially had a severe sore throat.  Then developed facial pain across both of his cheeks and the bridge of his nose.  He has had postnasal drainage and rhinorrhea.  He has had mucus that is yellow and green in color.  He has had cough that is productive of some phlegm.  Feels a lot of facial pain and pressure especially at night.  He has had drainage from his eyes and he woke up this morning with his eyes glued shut.  That is what prompted him to make this appointment.  He has not had fevers or chills.  Continues to have sore throat.  Ears have been feeling okay.  He has been taking hot showers and has used Chloraseptic spray.  He has no history of sinus infections.  We reviewed medications and allergies.  Review of systems is assessed and is otherwise negative.    Current Outpatient Prescriptions   Medication Sig Dispense Refill     albuterol (PROAIR HFA;PROVENTIL HFA;VENTOLIN HFA) 90 mcg/actuation inhaler Inhale 2 puffs every 4 (four) hours as needed for wheezing or shortness of breath. 3 Inhaler 0     atorvastatin (LIPITOR) 20 MG tablet Take 1 tablet (20 mg total) by mouth at bedtime. 90 tablet 3     budesonide-formoterol (SYMBICORT) 160-4.5 mcg/actuation inhaler Inhale 2 puffs 2 (two) times a day. 3 Inhaler 3     clotrimazole-betamethasone (LOTRISONE) cream  apply topically sparingly to affected areas twice daily as needed 45 g 2     fluticasone (FLONASE) 50 mcg/actuation nasal spray 2 sprays into each nostril daily. 48 g 3     lisinopril (PRINIVIL,ZESTRIL) 10 MG tablet Take 1 tablet (10 mg total) by mouth daily. 90 tablet 3     metroNIDAZOLE (METROGEL) 1 % gel apply topically to affected areas twice daily for rosacea management 180 g 3     amoxicillin-clavulanate (AUGMENTIN) 875-125 mg per tablet Take 1 tablet by mouth 2 (two) times a day for 10 days. 20 tablet 0     olopatadine (PATANOL) 0.1 % ophthalmic solution one drop both eyes twice daily 15 mL 3     No current facility-administered medications for this visit.        Past Medical History, Family History, and Social History reviewed.  Past Medical History:   Diagnosis Date     Hypertension      No past surgical history on file.  Grass and Pollen  No family history on file.  Social History     Social History     Marital status:      Spouse name: N/A     Number of children: N/A     Years of education: N/A     Occupational History     Not on file.     Social History Main Topics     Smoking status: Never Smoker     Smokeless tobacco: Never Used     Alcohol use 3.0 - 3.6 oz/week     5 - 6 Standard drinks or equivalent per week     Drug use: No     Sexual activity: Not on file     Other Topics Concern     Not on file     Social History Narrative         Review of systems is as stated in HPI, and the remainder of the 10 system review is otherwise negative.    Objective:     Vitals:    02/15/18 0828   BP: 121/72   Patient Site: Right Arm   Patient Position: Sitting   Cuff Size: Adult Large   Pulse: 86   Temp: 98.2  F (36.8  C)   TempSrc: Tympanic   SpO2: 96%   Weight: (!) 228 lb 3 oz (103.5 kg)    Body mass index is 32.28 kg/(m^2).        General appearance: alert, appears stated age and cooperative  Head: Normocephalic, without obvious abnormality, atraumatic  Eyes: negative  Ears: normal TM's and external ear  canals both ears  Nose: no sinus tenderness, Nasal mucosa is mildly erythematous  Throat: Purulent postnasal drainage present  Neck: no adenopathy and thyroid not enlarged, symmetric, no tenderness/mass/nodules  Lungs: clear to auscultation bilaterally  Heart: regular rate and rhythm, S1, S2 normal, no murmur, click, rub or gallop      This note has been dictated using voice recognition software. Any grammatical or context distortions are unintentional and inherent to the the software.

## 2021-06-18 NOTE — PROGRESS NOTES
Assessment/Plan:    1. Gout flare  I suspect patient has an acute gout flare based on his history of present illness and findings on exam today.  Foot x-ray is negative for any fracture or dislocation.  Will have radiology confirm this.  Will obtain uric acid level and cbc and follow-up with patient regarding these results.  Prescription provided for prednisone, 20 mg twice daily for 5 days.  Instructed patient to hold off on taking ibuprofen while taking the prednisone.  He may continue with ibuprofen after finishing course of prednisone.  Discussed red flag symptoms that would trigger prompt return to clinic. He understands and is comfortable with this plan.   - XR Foot Right 3 or More VWS; Future  - Uric Acid  - predniSONE (DELTASONE) 20 MG tablet; Take 1 tablet (20 mg total) by mouth 2 (two) times a day for 5 days.  Dispense: 10 tablet; Refill: 0    Subjective:     Obdulio Ambrocio is a 58 year old male seen today for evaluation of her right great toe pain.  Patient first noticed that joint in big toe became red and painful about 2 weeks ago.  Does not recall any major injury however did bump the inside of his foot on a piece of furniture.  It was a little sore at the time but nothing significant.  A few days later his right big toe of the same foot became red, hot and painful.  Pain has come and gone over the last 2 weeks.  He has been icing it which does provide relief.  Also take ibuprofen 400 mg in the morning of it.  Pain is worse with walking and weightbearing.  When pain subsides he is able to move toe regularly without any discomfort.  Has been doing some research and feels that it could be gout.  He has never had history of gout. Does note that he eats a lot of red meat.  He has tried to cut back on it since reading up on potential gout causes.  He is not having any pain in other joints.  He feels well otherwise.  Denies any recent illness. Review of systems is as stated in HPI, and the remainder of the  "10 system review is otherwise unremarkable.    Past Medical History, Family History, and Social History reviewed.   \"Daphne\"   2 daughters - asthma   Construction management - escalator work, prior elevator work   No smoke   Occ EtOH   Mom -   Dad - HTN   2 bros -   4 sis -   No surgeries/hospitalizations   Tdap 1/8/08 4/1/05 - cholesterol 211, HDL39   Peak Flow Max = 650      Past Medical History:   Diagnosis Date     Hypertension         Social History   Substance Use Topics     Smoking status: Never Smoker     Smokeless tobacco: Never Used     Alcohol use 3.0 - 3.6 oz/week     5 - 6 Standard drinks or equivalent per week        Current Outpatient Prescriptions   Medication Sig Dispense Refill     albuterol (PROAIR HFA;PROVENTIL HFA;VENTOLIN HFA) 90 mcg/actuation inhaler Inhale 2 puffs every 4 (four) hours as needed for wheezing or shortness of breath. 3 Inhaler 0     atorvastatin (LIPITOR) 20 MG tablet Take 1 tablet (20 mg total) by mouth at bedtime. 90 tablet 3     budesonide-formoterol (SYMBICORT) 160-4.5 mcg/actuation inhaler Inhale 2 puffs 2 (two) times a day. 3 Inhaler 3     clotrimazole-betamethasone (LOTRISONE) cream apply topically sparingly to affected areas twice daily as needed 45 g 2     fluticasone (FLONASE) 50 mcg/actuation nasal spray 2 sprays into each nostril daily. 48 g 3     lisinopril (PRINIVIL,ZESTRIL) 10 MG tablet Take 1 tablet (10 mg total) by mouth daily. 90 tablet 3     metroNIDAZOLE (METROGEL) 1 % gel apply topically to affected areas twice daily for rosacea management 180 g 3     olopatadine (PATANOL) 0.1 % ophthalmic solution one drop both eyes twice daily 15 mL 3     azithromycin (ZITHROMAX) 250 MG tablet Take 2 tabs on day one, and then 1 tab on days 2-5. 6 tablet 0     predniSONE (DELTASONE) 20 MG tablet Take 1 tablet (20 mg total) by mouth 2 (two) times a day for 5 days. 10 tablet 0     No current facility-administered medications for this visit.     "       Objective:    Vitals:    06/25/18 0825   BP: 118/62   Patient Site: Right Arm   Patient Position: Sitting   Cuff Size: Adult Regular   Pulse: 60   Weight: (!) 225 lb 1.6 oz (102.1 kg)   Height: 6' (1.829 m)      Body mass index is 30.53 kg/(m^2).      General Appearance:  Alert, cooperative, no distress, appears stated age   Lungs:   Clear to auscultation bilaterally, respirations unlabored.    Heart:  Regular rate and rhythm, S1, S2 normal.   Extremities: Right great toe with mild erythema and edema. Some tenderness on palpation. ROM, sensation and strength intact.   Skin: Warm, dry.  Skin color, texture, turgor normal, no rashes or lesions         This note has been dictated using voice recognition software. Any grammatical or context distortions are unintentional and inherent to the use of this software.

## 2021-06-18 NOTE — PATIENT INSTRUCTIONS - HE
Patient Instructions by Nilesh Fregoso MD at 11/9/2020  3:10 PM     Author: Nilesh Fregoso MD Service: -- Author Type: Physician    Filed: 11/9/2020  3:36 PM Encounter Date: 11/9/2020 Status: Signed    : Nilesh Fregoso MD (Physician)       It was a pleasure to meet with you today.      Below is a summary of your visit.   1. Increase your atorvastatin to 40 mg once daily  2. Start taking a baby aspirin (81 mg) daily  3. Be as active as you can tolerate being. The more activity you get, the better for your heart health and for identifying symptoms of coronary artery disease.  4. If you note an increase in muscle aches or joint aches with the increase in atorvastatin you can also take the over-the-counter supplement of CoQ-10. This can help prevent discomfort/pain from atorvastatin.  5. Follow up in about 1 year or sooner if needed.      Foods that are high in magnesium include the following    Dark chocolate    Avocados    Nuts, such as almonds, cashews, Brazil nuts    Legumes, such as lentils, beans, chickpeas, peas, soy beans    Tofu    Seeds, such as flax, pumpkin, and ricky    Whole grains such as Quinoa and buckwheat    Certain kinds of fish, such as [place], Mackerel, and Halibut    Bananas    Leafy greens, such as kale, spinach, and collared greens      Please do not hesitate to call the [facility] clinic with any questions or concerns at [phone].    Sincerely,

## 2021-06-19 NOTE — LETTER
Letter by Hardik Mg MD at      Author: Hardik Mg MD Service: -- Author Type: --    Filed:  Encounter Date: 6/6/2019 Status: (Other)         Obdulio Ambrocio  [address] [facility] [address]             June 6, 2019         Dear Mr. Ambrocio,    Below are the results from your recent visit:    Resulted Orders   Lipid Cascade   Result Value Ref Range    Cholesterol 252 (H) <=199 mg/dL    Triglycerides 129 <=149 mg/dL    HDL Cholesterol 52 >=40 mg/dL    LDL Calculated 174 (H) <=129 mg/dL    Patient Fasting > 8hrs? Yes    Uric Acid   Result Value Ref Range    Uric Acid 8.2 (H) 3.0 - 8.0 mg/dL   Comprehensive Metabolic Panel   Result Value Ref Range    Sodium 136 136 - 145 mmol/L    Potassium 4.8 3.5 - 5.0 mmol/L    Chloride 101 98 - 107 mmol/L    CO2 24 22 - 31 mmol/L    Anion Gap, Calculation 11 5 - 18 mmol/L    Glucose 100 70 - 125 mg/dL    BUN 15 8 - 22 mg/dL    Creatinine 1.11 0.70 - 1.30 mg/dL    GFR MDRD Af Amer >60 >60 mL/min/1.73m2    GFR MDRD Non Af Amer >60 >60 mL/min/1.73m2    Bilirubin, Total 0.8 0.0 - 1.0 mg/dL    Calcium 10.4 8.5 - 10.5 mg/dL    Protein, Total 7.7 6.0 - 8.0 g/dL    Albumin 4.9 3.5 - 5.0 g/dL    Alkaline Phosphatase 86 45 - 120 U/L    AST 26 0 - 40 U/L    ALT 34 0 - 45 U/L    Narrative    Fasting Glucose reference range is 70-99 mg/dL per  American Diabetes Association (ADA) guidelines.   HM2(CBC w/o Differential)   Result Value Ref Range    WBC 4.6 4.0 - 11.0 thou/uL    RBC 4.84 4.40 - 6.20 mill/uL    Hemoglobin 15.2 14.0 - 18.0 g/dL    Hematocrit 44.8 40.0 - 54.0 %    MCV 92 80 - 100 fL    MCH 31.4 27.0 - 34.0 pg    MCHC 33.9 32.0 - 36.0 g/dL    RDW 12.7 11.0 - 14.5 %    Platelets 239 140 - 440 thou/uL    MPV 7.3 7.0 - 10.0 fL   Hepatitis C Antibody (Anti-HCV)   Result Value Ref Range    Hepatitis C Ab Positive (!) Negative   PSA (Prostatic-Specific Antigen), Annual Screen   Result Value Ref Range    PSA 1.1 0.0 - 3.5 ng/mL    Narrative    Method is Abbott Prostate-Specific  Antigen (PSA)  Standard-WHO 1st International (90:10)   Glycosylated Hemoglobin A1c   Result Value Ref Range    Hemoglobin A1c 5.8 3.5 - 6.0 %   Lyme Antibody Cascade   Result Value Ref Range    Lyme Antibody Cascade 0.10 <0.90 Index Value    Narrative    Interpretation of Lyme Disease Total Antibody (IgG/IgM)  <0.90 Test Value=Negative  No detectable antibodies to B. burgdorferi. Patients  in early stages of infection may not produce  detectable levels of antibody. Antibiotic therapy  in early disease may prevent antibody production  from reaching detectable levels. Patients with  clinical history and/or symptoms suggestive of Lyme  disease but with negative test results should be  retested in 2-4 weeks.  0.90-<1.10 Test Value=Borderline  Suggests the presence of antibodies to B.  burgdorferi. Recommend repeat collection in 2-4  weeks.  >=1.10 Test Value=Positive  Indicates the presence of antibodies to B.  burgdorferi. False positive results can occur with  sera from syphilis patients. Cross-reactivity may  occur with relapsing fever, [place] Spotted  fever, other spirochetal diseases, erythematosus,  EBV infection, or CMV infection. Clinical symptoms,  epidemiology of the case and other laboratory tests  should allow for distinction of these conditions from  Lyme disease.   C-Reactive Protein   Result Value Ref Range    CRP 0.2 0.0 - 0.8 mg/dL   Erythrocyte Sedimentation Rate   Result Value Ref Range    Sed Rate 5 0 - 15 mm/hr   Rheumatoid Factor Quant   Result Value Ref Range    Rheumatoid Factor Quantitative <15.0 0 - 30 IU/mL   Antinuclear Antibody (VANESSA) Cascade   Result Value Ref Range    VANESSA Screen Cascade 0.2 <=2.9 U    Narrative    <1.0 negative  1.1-2.9 weakly positive  3.0-5.9 positive ( reflex)  > or=6.0 strongly positive   Hepatitis C RNA Quantitation by RT-PCR   Result Value Ref Range    HCV RNA Quant HCV RNA Not Detected HCVND [IU]/mL      Comment:      The KARY AmpliPrep/KARY TaqMan HCV  "Test is an FDA-approved in vitro nucleic  acid amplification test for the quantitation of HCV RNA in human plasma (ETDA  plasma) or serum using the KARY AmpliPrep Instrument for automated viral  nucleic acid extraction and the KARY TaqMan Analyzer or KARY TaqMan for  automated Real Time PCR amplification and detection of the viral nucleic acid  target.  Titer results are reported in International Units/mL (IU/mL) using the 1st WHO  International standard for HCV for Nucleic Acid Amplification based assays.    Log of HCV RNA Qt Not Calculated <1.2 Log IU/mL      Comment:      Performed and/or entered by:  [facility]  [address] [place] [id number]       Your cholesterol results were elevated.  Begin atorvastatin 20 mg daily as discussed.  Ensure regular exercise, healthy diet, and weight loss modifications in order to further improve.  Weight goal < 210 pounds initially, < 200 pounds ideally.  We will plan to recheck your labs while fasting in the next 3-6 months to ensure desired improvement.       Your labs suggest \"pre-diabetes\".  Goal fasting glucose is < 100.  Your fasting glucose was 100.  Goal \"average blood sugar\" (i.e. A1c) is < 5.7%.  Your A1c was 5.8%. Ensure regular exercise, healthy diet, and weight loss modifications in order to further improve.  Weight goal < 210 pounds initially, < 200 pounds ideally.  We will continue to follow closely.      Your kidney and liver tests were normal.     Your uric acid level is elevated.  Ensure that you follow a gout diet.  Goal uric acid < 6.0 ideally.    Your complete blood count results were normal.  No evidence for anemia, etc.     Your prostate cancer screening test (i.e. PSA) was normal.     Your \"inflammation test\" (i.e. Sed Rate) was normal.      Your \"inflammation test\" (i.e. CRP) was normal.     Your arthritis labs were normal.    Your hepatitis C screen was abnormal, however, no evidence for hepatitis C " circulating in your blood.  No further concern at this time.    Please call with questions or contact us using [employer].    Sincerely,        Electronically signed by Hardik Mg MD

## 2021-06-23 NOTE — TELEPHONE ENCOUNTER
RN cannot approve Refill Request    RN can NOT refill this medication PCP messaged that patient is overdue for Labs. Last office visit: 2/2/2017 Hardik Mg MD Last Physical: 1/3/2018 Last MTM visit: Visit date not found Last visit same specialty: 6/25/2018 Greta Goldman CNP.  Next visit within 3 mo: Visit date not found  Next physical within 3 mo: Visit date not found      Phoebe MULLINS Lolly, [facility] Triage/Med Refill 1/31/2019    Requested Prescriptions   Pending Prescriptions Disp Refills     lisinopril (PRINIVIL,ZESTRIL) 10 MG tablet [Pharmacy Med Name: LISINOPRIL TABS 10MG] 90 tablet 3     Sig: TAKE 1 TABLET DAILY    Ace Inhibitors Refill Protocol Failed - 1/31/2019 11:14 AM       Failed - Serum Potassium in past 12 months    No results found for: LN-POTASSIUM         Failed - Serum Creatinine in past 12 months    Creatinine   Date Value Ref Range Status   01/03/2018 1.01 0.70 - 1.30 mg/dL Final            Passed - PCP or prescribing provider visit in past 12 months      Last office visit with prescriber/PCP: 2/2/2017 Hardik Mg MD OR same dept: 6/25/2018 Greta Goldman CNP OR same specialty: 6/25/2018 Greta Goldman CNP  Last physical: 1/3/2018 Last MTM visit: Visit date not found   Next visit within 3 mo: Visit date not found  Next physical within 3 mo: Visit date not found  Prescriber OR PCP: Hardik Mg MD  Last diagnosis associated with med order: 1. Essential hypertension  - lisinopril (PRINIVIL,ZESTRIL) 10 MG tablet [Pharmacy Med Name: LISINOPRIL TABS 10MG]; TAKE 1 TABLET DAILY  Dispense: 90 tablet; Refill: 3    If protocol passes may refill for 12 months if within 3 months of last provider visit (or a total of 15 months).            Passed - Blood pressure filed in past 12 months    BP Readings from Last 1 Encounters:   06/25/18 118/62

## 2021-06-25 NOTE — TELEPHONE ENCOUNTER
RN cannot approve Refill Request    RN can NOT refill this medication PCP messaged that patient is overdue for Labs and Office Visit. Last office visit: 12/6/2019 Hardik Rodriguez MD Last Physical: 5/31/2019 Last MTM visit: Visit date not found Last visit same specialty: 12/6/2019 Hardik Rodriguez MD.  Next visit within 3 mo: Visit date not found  Next physical within 3 mo: Visit date not found      Gin Veras, [facility] Triage/Med Refill 5/31/2021    Requested Prescriptions   Pending Prescriptions Disp Refills     lisinopriL (PRINIVIL,ZESTRIL) 10 MG tablet [Pharmacy Med Name: LISINOPRIL TABS 10MG] 90 tablet 3     Sig: TAKE 1 TABLET DAILY (SCHEDULE A FASTING OFFICE VISIT WITH DR. RODRIGUEZ BEFORE FURTHER REFILLS)       Ace Inhibitors Refill Protocol Failed - 5/31/2021 12:13 PM        Failed - PCP or prescribing provider visit in past 12 months       Last office visit with prescriber/PCP: 12/6/2019 Hardik Rodriguez MD OR same dept: Visit date not found OR same specialty: 12/6/2019 Hardik Rodriguez MD  Last physical: 5/31/2019 Last MTM visit: Visit date not found   Next visit within 3 mo: Visit date not found  Next physical within 3 mo: Visit date not found  Prescriber OR PCP: Hardik Rodriguez MD  Last diagnosis associated with med order: 1. Essential hypertension  - lisinopriL (PRINIVIL,ZESTRIL) 10 MG tablet [Pharmacy Med Name: LISINOPRIL TABS 10MG]; TAKE 1 TABLET DAILY (SCHEDULE A FASTING OFFICE VISIT WITH DR. RODRIGUEZ BEFORE FURTHER REFILLS)  Dispense: 90 tablet; Refill: 3    If protocol passes may refill for 12 months if within 3 months of last provider visit (or a total of 15 months).             Failed - Serum Potassium in past 12 months     No results found for: LN-POTASSIUM          Failed - Serum Creatinine in past 12 months     Creatinine   Date Value Ref Range Status   12/06/2019 1.00 0.70 - 1.30 mg/dL Final             Passed - Blood pressure filed in past 12 months     BP Readings from Last 1  Encounters:   11/09/20 118/60

## 2021-06-25 NOTE — PROGRESS NOTES
"Progress Notes by Daniel Albert DO at 1/20/2017  1:20 PM     Author: Daniel Albert DO Service: -- Author Type: Physician    Filed: 1/21/2017  9:24 AM Encounter Date: 1/20/2017 Status: Signed    : Daniel Albert DO (Physician)       Chief Complaint   Patient presents with   ? Eye Drainage     woke up this morning eyes matted shut, red, \"goo\" coming out of them.       Chief Complaint   Patient presents with   ? Eye Drainage     woke up this morning eyes matted shut, red, \"goo\" coming out of them.        History of Present Illness: Nursing notes reviewed. Bilateral eye mattering is main concern. He would also like a refill of his albuterol inhaler. He has been using it more in colder weather.  No other symptoms of illness other then the mattery eyes. The eyes burn a little. He wears glasses for reading. Normally his distance vision is good. His eyes today feel like they have a \"film\" over them. I discussed his elevated blood pressure at exam. He normally has good blood pressure. He has a clinic at work to go to and have his blood pressure rechecked. I told him I felt his higher blood pressure today was at least partially related to being ill.    Review of systems: patient has history of mild persistent asthma. See history of present illness, otherwise negative.     Current Outpatient Prescriptions   Medication Sig Dispense Refill   ? albuterol (PROVENTIL HFA;VENTOLIN HFA) 90 mcg/actuation inhaler Inhale 2 puffs every 4 (four) hours as needed for wheezing or shortness of breath. 3 Inhaler 0   ? amoxicillin-clavulanate (AUGMENTIN) 875-125 mg per tablet Take 1 tablet by mouth 2 (two) times a day for 10 days. 20 tablet 0   ? tobramycin (TOBREX) 0.3 % ophthalmic solution Administer 1 drop to both eyes every 4 (four) hours while awake for 7 days. 5 mL 0     No current facility-administered medications for this visit.        No past medical history on file.   No past surgical history on file.   Social History "     Social History   ? Marital status:      Spouse name: N/A   ? Number of children: N/A   ? Years of education: N/A     Social History Main Topics   ? Smoking status: Never Smoker   ? Smokeless tobacco: None   ? Alcohol use None   ? Drug use: None   ? Sexual activity: Not Asked     Other Topics Concern   ? None     Social History Narrative   ? None       History   Smoking Status   ? Never Smoker   Smokeless Tobacco   ? Not on file      Exam:   Blood pressure (!) 158/94, pulse 81, temperature 98.7  F (37.1  C), temperature source Oral, resp. rate 20, weight 221 lb 11.2 oz (100.6 kg), SpO2 96 %.    EXAM:   Wt Readings from Last 3 Encounters:   01/20/17 221 lb 11.2 oz (100.6 kg)     Temp Readings from Last 3 Encounters:   01/20/17 98.7  F (37.1  C) (Oral)     BP Readings from Last 3 Encounters:   01/20/17 (!) 158/94     Pulse Readings from Last 3 Encounters:   01/20/17 81     Blood pressure on 03/27/2014 was 130/80   General: Vital signs reviewed. Patient is in no acute appearing distress. Breathing is non labored appearing. Patient is alert and oriented x 3.   ENT: Ear exam shows mild right TM dullness and injection with left TM being clear without injection, right nasal turbinates are injected and edematous with increased rhinorrhea noted, no pharyngeal injection with increased post nasal drainage noted.  Eyes: mild bilateral scleral injection, with a small amount of mattering in right tear duct. Corneas are clear. Pupils are equal. No stye formation noted on outer or mucosal side of eye lids.  Neck: supple with no abnormal masses  Heart: Normal rate and rhythm without murmur  Lungs: Clear to auscultation with good air flow bilaterally.  Skin: warm and dry  Visual acuity results reviewed, with mildly diminished acuity noted bilaterally.    Assessment/Plan   1. Mild persistent asthma     2. Bacterial conjunctivitis of both eyes  tobramycin (TOBREX) 0.3 % ophthalmic solution   3. Sinusitis   amoxicillin-clavulanate (AUGMENTIN) 875-125 mg per tablet   4. Elevated blood pressure         Patient Instructions   Also see info below. Be seen again in 2 days if symptoms are not better, sooner if feeling any worse. Th ideal blood pressure is close to 120/80. I think it is elevated today due to illness. Try to get it rechecked again within 10 days. If it remains elevated, be seen for management.  Conjunctivitis Caused by Infection  Infections are caused by viruses or bacteria. Treatment includes keeping your eyes and hands clean. Your doctor may prescribe eyedrops, and tell you to stay home from work or school if youre contagious. Untreated infections can be serious, so its important that a doctor diagnoses you.    Viral Infections  A cold, flu, or other virus can spread to the eyes. This causes a watery discharge. The eyes may burn or itch and get red. The eyelids may also be puffy and sore.  Treating the infections. Most viral infections go away on their own. Artificial tears and warm compresses can relieve symptoms. Your doctor may also prescribe eyedrops. A viral infection can be very contagious and spreads quickly. To prevent this, wash your hands often. Use a separate tissue to wipe each eye. Dont touch your eyes or share bedding or towels.  Bacterial Infections  Bacterial infections often occur in one eye. There may be a watery or a thick discharge from the eye. These infections can cause serious damage to the eye if not treated promptly.  Treating the infections. Your doctor may prescribe eyedrops or ointment to kill the bacteria. Warm compresses can help keep the eyelids clean. To keep the bacteria from spreading, wash your hands often. Use a separate tissue to wipe each eye. Dont touch your eyes or share bedding or towels.    1699-9670 [employer] [address] [place] [address], [place] [address]. All rights reserved. This information is not intended as a substitute for professional medical  care. Always follow your healthcare professional's instructions.          Acute Sinusitis  Acute sinusitis is inflammation (irritation and swelling) of the sinuses. It is usually due to a viral infection of the sinuses, although bacteria may be involved in prolonged cases. This may follow a cold or other upper respiratory illness. Your doctor can help you find relief. Read on to learn more.       What is acute sinusitis?  Sinuses are air-filled spaces in the skull behind the face. They are kept moist and clean by a lining of mucosa. Things such as pollen, smoke, and chemical fumes can irritate the mucosa. It can then become inflamed (swell up). As a response to irritation, the mucosa makes more mucus and other fluids. Tiny hairlike cilia cover the mucosa. Cilia help transport mucus toward the opening of the sinus. Too much mucus may cause the cilia to stop working. This blocks the sinus opening. A buildup of fluid in the sinuses then leads to symptoms such as pain and pressure. It can also encourage growth of bacteria in the sinuses.  Common symptoms of acute sinusitis  You may have:    Facial pain    Headache    Fever    Postnasal drip    Nasal congestion    Redness of facial skin over sinus  Diagnosis of acute sinusitis  The doctor will ask about your symptoms and medical history. An evaluation will be done. A culture (sample of mucus) is sometimes taken to check for bacteria. If you have multiple bouts of sinusitis, imaging (X-rays or CAT scans) may be done to check for an anatomic cause of the infection.  Treatment of acute sinusitis  Treatment is designed to unblock the sinus opening and help the cilia work again. Antihistamine and decongestant medications may be prescribed. These can reduce inflammation and decrease fluid production. If a bacterial infection is present, it can be treated with antibiotic medication. This medication should be taken until it is gone, even if you feel better. However, most  sinusitis is caused by viruses, antibiotics will not help a viral infection.    8105-5284 [employer]. [address] [place] [address]. All rights reserved. This information is not intended as a substitute for professional medical care. Always follow your healthcare professional's instructions.          Taking Your Blood Pressure  Blood pressure is the force of blood as it moves from the heart through the blood vessels. You can take your own blood pressure reading using a digital monitor. Take readings as often as your doctor instructs. Take each reading at the same time of day.    Step 1. Relax    Wait at least a half-hour after smoking, eating, or exercising.    Sit comfortably at a table. Place the monitor near you.    Rest for a few minutes before you begin.    Step 2. Wrap the Cuff    Place your arm on the table, palm up. Your arm should be at the level of your heart. Wrap the cuff around your upper arm, just above your elbow. Its best done on bare skin, not over clothing.    Make sure your cuff fits. If it doesnt wrap around your upper arm, order a larger cuff.    Step 3. Inflate the Cuff    Pump the cuff until the scale reads 160. If you have a self-inflating cuff, push the button that starts the pump.    The cuff will tighten, then loosen.    The numbers will change. When they stop changing, your blood pressure reading will appear.    If you get a reading that is too high or too low for you, relax for a few minutes. Then do the test again.    Step 4. Write Down the Results    Write down your blood pressure numbers. Note the date and time. Keep your results in one place, such as a notebook.    Remove the cuff from your arm. Turn off the machine.    2815-9624 [employer]. [address] [place] [address]. All rights reserved. This information is not intended as a substitute for professional medical care. Always follow your healthcare professional's  instructions.           Daniel Albert DO

## 2021-06-26 NOTE — PROGRESS NOTES
Assessment/Plan:     1. Routine general medical examination at a health care facility  Routine healthcare maintenance.  Preventative cares reviewed.  Immunizations up-to-date.  Prior colonoscopy January 23, 2013 and told to repeat at 10-year interval.  Routine HIV and PSA screen to be updated.  - HIV Antigen/Antibody Screening Cascade; Future  - PSA (Prostatic-Specific Antigen), Annual Screen; Future    2. Class 1 obesity due to excess calories with serious comorbidity and body mass index (BMI) of 31.0 to 31.9 in adult  Dietary and exercise modifications for weight goal less than 210 pounds initially, less than 200 pounds ideally.    3. Mild persistent asthma with acute exacerbation  Symbicort 160/4.5 using 2 puffs twice daily with benefits.  Albuterol metered-dose inhaler available on as-needed basis.  Asthma control test 22/25.  - budesonide-formoteroL (SYMBICORT) 160-4.5 mcg/actuation inhaler; Inhale 2 puffs 2 (two) times a day.  Dispense: 3 Inhaler; Refill: 3    4. Essential hypertension  Continued use of lisinopril 10 mg daily.  Basic metabolic panel for med monitoring.  Anticipate recheck in 6 months.  - lisinopriL (PRINIVIL,ZESTRIL) 10 MG tablet; TAKE 1 TABLET BY MOUTH DAILY  Dispense: 90 tablet; Refill: 3  - Basic Metabolic Panel; Future    5. Hyperlipidemia, unspecified hyperlipidemia type  Lipid cascade to be updated.  We will schedule for fasting labs.  Continues use of atorvastatin 40 mg daily.  - Lipid Cascade; Future  - atorvastatin (LIPITOR) 40 MG tablet; Take 1 tablet (40 mg total) by mouth daily.  Dispense: 90 tablet; Refill: 3    6. Achilles tendinitis of left lower extremity  Left Achilles tendinitis, chronic.  Avoidance of exacerbating triggers.  Heel cord stretches etc.  Consider orthopedic referral if persistent issues.  Reassess at follow-up no later than 6 months.    7. Impaired fasting glucose  Impaired fasting glucose.  Check A1c and fasting glucose at earliest convenience.   "Therapeutic lifestyle changes reviewed as noted above.    - Basic Metabolic Panel; Future  - Glycosylated Hemoglobin A1c; Future    8. Chronic gout with tophus, unspecified cause, unspecified site  Uric acid level and CBC completed.  Ensure stable renal function.  Uric acid goal less than 6.0.  - Uric Acid; Future  - HM2(CBC w/o Differential); Future    9. Acute gout, unspecified cause, unspecified site  Indomethacin provided in place of colchicine per patient request stating indomethacin 50 mg 3 times daily on as-needed basis works better.  Refill provided.  - indomethacin (INDOCIN) 50 MG capsule; 50 mg three times per day x 3-5 days as needed for gout flare  Dispense: 60 capsule; Refill: 3    10. AR (allergic rhinitis)  Allergic rhinitis present.  Fluticasone nasal spray to continue.  - fluticasone propionate (FLONASE) 50 mcg/actuation nasal spray; Apply 2 sprays into each nostril daily.  Dispense: 48 g; Refill: 3    11. Allergic conjunctivitis of both eyes  Patanol ophthalmic drops to continue on as-needed basis for seasonal allergies.  - olopatadine (PATANOL) 0.1 % ophthalmic solution; INSTILL 1 DROP IN BOTH EYES TWICE A DAY  Dispense: 15 mL; Refill: 3       I have had an Advance Directives discussion with the patient.  The following high BMI interventions were performed this visit: encouragement to exercise, weight monitoring, weight loss from baseline weight and lifestyle education regarding diet  Ensure ongoing efforts to achieve weight goal < 210 pounds initially, < 200 pounds ideally.              Subjective:      Obdulio Ambrocio is a 61 y.o. male who presents for an annual exam.  In general doing well and when asked how he is feeling he says \"2 thumbs way up\".  Continues lisinopril 10 mg daily for hypertension.  Atorvastatin 40 mg daily for lipid management.  Impaired fasting glucose history.  Has had gout recurrent.  Colchicine does not work as well and would like refill on indomethacin ideally.  Wondering " "if gout in right elbow recently.  Asthma history.  Continues use of Symbicort 160/4.5 using 2 puffs twice daily as well as albuterol metered-dose inhaler on as-needed basis.  Non-smoker.  Patanol ophthalmic drops for allergic conjunctivitis.  Fluticasone nasal spray for allergic rhinitis management.  Prior colonoscopy January 23, 2013 told to repeat at 10-year interval.  Immunizations reviewed and up-to-date.  Comprehensive review of systems as above otherwise all negative.     \"Daphne\"   2 daughters - asthma   Construction management - escalator work, prior elevator work   No smoke   Occ EtOH   Mom -   Dad - HTN   2 bros -   4 sis -   No surgeries/hospitalizations   Tdap 1/8/08 4/1/05 - cholesterol 211, HDL39   Peak Flow Max = 650    Healthy Habits:   Regular Exercise: Yes  Healthy Diet: Yes  Dental Visits Regularly: Yes  Seat Belt: Yes   Sexually active: Yes  Colonoscopy: Yes and 1/23/13 (repeat in 10 years)  Lipid Profile: Yes  Glucose Screen: Yes    Immunization History   Administered Date(s) Administered     COVID-19,PF,Moderna 03/27/2021, 04/24/2021     Hep A, historic 11/18/2002, 04/01/2005     Influenza, inj, historic,unspecified 10/01/2016, 10/20/2017, 10/31/2019     Influenza, seasonal,quad inj 6-35 mos 09/18/2009     Influenza,seasonal quad, PF 12/27/2013     Pneumo Polysac 23-V 02/02/2017     Td, adult adsorbed, PF 01/03/2018     Td,adult,historic,unspecified 06/10/1998     Tdap 01/08/2008     ZOSTER, RECOMBINANT, IM 12/06/2019, 12/14/2020     Immunization status: up to date and documented.  Vision Screening:both eyes  Hearing: PASS     Current Outpatient Medications   Medication Sig Dispense Refill     albuterol (PROAIR HFA;PROVENTIL HFA;VENTOLIN HFA) 90 mcg/actuation inhaler Inhale 2 puffs every 4 (four) hours as needed for wheezing or shortness of breath. 3 Inhaler 3     clotrimazole-betamethasone (LOTRISONE) cream apply topically sparingly to affected areas twice daily as needed 45 g 2     " atorvastatin (LIPITOR) 40 MG tablet Take 1 tablet (40 mg total) by mouth daily. 90 tablet 3     budesonide-formoteroL (SYMBICORT) 160-4.5 mcg/actuation inhaler Inhale 2 puffs 2 (two) times a day. 3 Inhaler 3     fluticasone propionate (FLONASE) 50 mcg/actuation nasal spray Apply 2 sprays into each nostril daily. 48 g 3     indomethacin (INDOCIN) 50 MG capsule 50 mg three times per day x 3-5 days as needed for gout flare 60 capsule 3     lisinopriL (PRINIVIL,ZESTRIL) 10 MG tablet TAKE 1 TABLET BY MOUTH DAILY 90 tablet 3     olopatadine (PATANOL) 0.1 % ophthalmic solution INSTILL 1 DROP IN BOTH EYES TWICE A DAY 15 mL 3     No current facility-administered medications for this visit.      Past Medical History:   Diagnosis Date     Asthma      Hyperlipidemia      Hypertension      Past Surgical History:   Procedure Laterality Date     TONSILLECTOMY       Grass and Pollen  Family History   Problem Relation Age of Onset     Leukemia Mother      Heart attack Father      Coronary artery disease Father      Social History     Socioeconomic History     Marital status:      Spouse name: Not on file     Number of children: Not on file     Years of education: Not on file     Highest education level: Not on file   Occupational History     Not on file   Social Needs     Financial resource strain: Not on file     Food insecurity     Worry: Not on file     Inability: Not on file     Transportation needs     Medical: Not on file     Non-medical: Not on file   Tobacco Use     Smoking status: Never Smoker     Smokeless tobacco: Never Used   Substance and Sexual Activity     Alcohol use: Yes     Alcohol/week: 5.0 - 6.0 standard drinks     Types: 5 - 6 Standard drinks or equivalent per week     Drug use: No     Sexual activity: Not on file   Lifestyle     Physical activity     Days per week: Not on file     Minutes per session: Not on file     Stress: Not on file   Relationships     Social connections     Talks on phone: Not on  "file     Gets together: Not on file     Attends Sabianism service: Not on file     Active member of club or organization: Not on file     Attends meetings of clubs or organizations: Not on file     Relationship status: Not on file     Intimate partner violence     Fear of current or ex partner: Not on file     Emotionally abused: Not on file     Physically abused: Not on file     Forced sexual activity: Not on file   Other Topics Concern     Not on file   Social History Narrative     Not on file       Review of Systems  Comprehensive ROS: as above, otherwise all negative.           Objective:     /70   Pulse 76   Ht 5' 10.5\" (1.791 m)   Wt 220 lb (99.8 kg)   SpO2 97%   BMI 31.12 kg/m    Body mass index is 31.12 kg/m .    Physical    General Appearance:    Alert, cooperative, no distress, appears stated age.  BMI = 31.12.   Head:    Normocephalic, without obvious abnormality, atraumatic   Eyes:    PERRL, conjunctiva slightly injected otherwise corneas clear, EOM's intact, fundi     benign, both eyes        Ears:    Normal TM's and external ear canals, both ears   Nose:   Nares normal, septum midline, mucosa mildly inflamed, otherwise no drainage or sinus tenderness   Throat:   Lips, mucosa, and tongue normal; teeth and gums normal   Neck:   Supple, symmetrical, trachea midline, no adenopathy;        thyroid:  No enlargement/tenderness/nodules; no carotid    bruit or JVD   Back:     Symmetric, no curvature, ROM normal, no CVA tenderness   Lungs:     Clear to auscultation bilaterally, respirations unlabored   Chest wall:    No tenderness or deformity   Heart:    Regular rate and rhythm, S1 and S2 normal, no murmur, rub   or gallop   Abdomen:     Soft, non-tender, bowel sounds active all four quadrants,     no masses, no organomegaly.     Genitalia:    Normal male without lesion, discharge or tenderness.  No inguinal hernia noted.     Rectal:    Normal tone.  Prostate normal/symmetric, no masses or " tenderness.   Extremities:   Extremities normal, atraumatic, no cyanosis or edema.  Left Achilles tendon thickening and tenderness with palpation of tendon nodule without fluctuance.   Pulses:   2+ and symmetric all extremities   Skin:   Skin color, texture, turgor normal, no rashes or lesions   Lymph nodes:   Cervical, supraclavicular, and axillary nodes normal   Neurologic:   CNII-XII intact. Normal strength, sensation and reflexes       throughout                This note has been dictated using voice recognition software and as a result may contain minor grammatical errors and unintended word substitutions.

## 2021-06-27 ENCOUNTER — HEALTH MAINTENANCE LETTER (OUTPATIENT)
Age: 61
End: 2021-06-27

## 2021-06-29 NOTE — PROGRESS NOTES
Progress Notes by Nilesh Fregoso MD at 11/9/2020  3:10 PM     Author: Nilesh Fregoso MD Service: -- Author Type: Physician    Filed: 11/9/2020  3:52 PM Encounter Date: 11/9/2020 Status: Signed    : Nilesh Fregoso MD (Physician)           Thank you, Hardik Garcia MD, for asking the [facility] team to see Mr. Obdulio Ambrocio to  Follow-up coronary artery disease.      Assessment/Recommendations   Assessment:    1. Coronary artery disease with 40-60% left main stenosis by CT coronary angiogram - stable without angina  2. Hyperlipidemia - treated with atorvastatin  3. Hypertension - well-controlled.  4. Mild obesity - BMI 31    Plan:  1. Increase atorvastatin to 40 mg daily  2. Advised regular exercise  3. Follow up in 1 year  4. Plan for another stress test in 2 years (2022).         History of Present Illness   Mr. Obdulio Ambrocio is a 60 y.o. male who presents for follow-up of coronary artery disease.     Mr. Ambrocio has moderate left main coronary artery stenosis based on a CT coronary angiogram from 6/3/2019. He was evaluated with a stress echocardiogram on 8/19/19 that was negative for ischemia at an exercise time of 9:30. His CAD is being treated medically.    Today, Mr. Ambrocio reports feeling well. He does endorse chronic arthritic joint pain in his knees. 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 He can walk up 10 flights of stairs carrying a toolbox without stopping at this time. No angina.      Other than noted above, Mr. Ambrocio denies any chest pain/pressure/tightness, shortness of breath at rest or with exertion, light headedness/dizziness, pre-syncope, syncope, lower extremity swelling, palpitations, paroxysmal nocturnal dyspnea (PND), or orthopnea.     Cardiac Problems and Cardiac Diagnostics     Most Recent Cardiac testing:    ECHO (report reviewed):   Echo results:   Results for orders placed during the hospital encounter  of 08/19/19   Echo Stress Exercise [ECH07] 08/19/2019    Narrative   Left ventricle ejection fraction is normal at baseline. The estimated   left ventricular ejection fraction is 60%. No significant valve disease.    The patient's exercise tolerance was normal. No chest pain reported.    The stress electrocardiogram is negative for inducible ischemic EKG   changes.    Stress echocardiogram is negative for inducible ischemia.    No previous study for comparison.          CTA Coronary with CAC 6/27/2019    The total Agatston calcium score is 168. A calcium score in this range places the individual in the 75th percentile when compared to an age and gender matched control group and implies a high risk of cardiac events in the next ten years.    Plaque within the left main coronary artery with an estimate luminal stenosis that appears mild to moderate in severity. Estimate luminal stenosis of 40 to 60%. Not obviously flow-limiting.    Mild disease in the ostial portion of the circumflex with an estimate luminal stenosis of 25 to 49%.    Mild disease in the left anterior descending with an estimate luminal stenosis of 25 to 49% in its proximal portion    Given the findings in the left main consideration should be given to additional testing possibly stress echocardiogram or stress nuclear examination to further evaluate for ischemia.    Please see separate report per radiology for additional findings.       Medications  Allergies   Current Outpatient Medications   Medication Sig Dispense Refill   ? albuterol (PROAIR HFA;PROVENTIL HFA;VENTOLIN HFA) 90 mcg/actuation inhaler Inhale 2 puffs every 4 (four) hours as needed for wheezing or shortness of breath. 3 Inhaler 3   ? clotrimazole-betamethasone (LOTRISONE) cream apply topically sparingly to affected areas twice daily as needed 45 g 2   ? fluticasone propionate (FLONASE) 50 mcg/actuation nasal spray USE 2 SPRAYS IN EACH NOSTRIL DAILY 48 g 3   ? lisinopriL  (PRINIVIL,ZESTRIL) 10 MG tablet TAKE 1 TABLET DAILY 90 tablet 2   ? olopatadine (PATANOL) 0.1 % ophthalmic solution INSTILL 1 DROP IN BOTH EYES TWICE A DAY 15 mL 3   ? atorvastatin (LIPITOR) 40 MG tablet Take 1 tablet (40 mg total) by mouth daily. 90 tablet 3   ? budesonide-formoterol (SYMBICORT) 160-4.5 mcg/actuation inhaler Inhale 2 puffs 2 (two) times a day. 3 Inhaler 3   ? colchicine (COLCRYS) 0.6 mg tablet Take 2 tablets by mouth at onset of gout symptoms then 1 tablet by mouth 1 hour later x 1 30 tablet 2   ? metroNIDAZOLE (METROGEL) 1 % gel apply topically to affected areas twice daily for rosacea management 180 g 3     No current facility-administered medications for this visit.       Allergies   Allergen Reactions   ? Grass    ? Pollen         Physical Examination Review of Systems   Vitals:    11/09/20 1515   BP: 118/60   Pulse: 68   Resp: 14     Body mass index is 31.26 kg/m .  Wt Readings from Last 3 Encounters:   11/09/20 221 lb (100.2 kg)   12/06/19 221 lb (100.2 kg)   08/13/19 211 lb (95.7 kg)       General Appearance:   Pleasant male, appears stated age. no acute distress, overweight body habitus   ENT/Mouth: Wearing a mask      EYES:  no scleral icterus, normal conjunctivae   Neck: supple   Respiratory:   lungs are clear to auscultation, no rales or wheezing, equal chest wall expansion    Cardiovascular:   Regular rhythm, normal rate. Normal first and second heart sounds with no murmurs, rubs, or gallops; Jugular venous pressure normal, no edema bilaterally    Abdomen/GI:  Soft, non-tender   Extremities: no cyanosis or clubbing   Skin: no xanthelasma, warm.    Heme/lymph/ Immunology No apparent bleeding noted.   Neurologic: Alert and oriented. normal gait, no tremors   Psychiatric: Pleasant, calm, appropriate affect.    A complete 10 system review of systems was performed and is negative except as mentioned in the HPI or below:  General: WNL  Eyes: WNL  Ears/Nose/Throat: WNL  Lungs: WNL  Heart:  WNL  Stomach: WNL  Bladder: WNL  Muscle/Joints: WNL  Skin: WNL  Nervous System: WNL  Mental Health: WNL     Blood: WNL       Past History   Past Medical History:   Past Medical History:   Diagnosis Date   ? Asthma    ? Hyperlipidemia    ? Hypertension        Past Surgical History:   Past Surgical History:   Procedure Laterality Date   ? TONSILLECTOMY         Family History:   Family History   Problem Relation Age of Onset   ? Leukemia Mother    ? Heart attack Father    ? Coronary artery disease Father         Social History:   Social History     Socioeconomic History   ? Marital status:      Spouse name: Not on file   ? Number of children: Not on file   ? Years of education: Not on file   ? Highest education level: Not on file   Occupational History   ? Not on file   Social Needs   ? Financial resource strain: Not on file   ? Food insecurity     Worry: Not on file     Inability: Not on file   ? Transportation needs     Medical: Not on file     Non-medical: Not on file   Tobacco Use   ? Smoking status: Never Smoker   ? Smokeless tobacco: Never Used   Substance and Sexual Activity   ? Alcohol use: Yes     Alcohol/week: 5.0 - 6.0 standard drinks     Types: 5 - 6 Standard drinks or equivalent per week   ? Drug use: No   ? Sexual activity: Not on file   Lifestyle   ? Physical activity     Days per week: Not on file     Minutes per session: Not on file   ? Stress: Not on file   Relationships   ? Social connections     Talks on phone: Not on file     Gets together: Not on file     Attends Zoroastrian service: Not on file     Active member of club or organization: Not on file     Attends meetings of clubs or organizations: Not on file     Relationship status: Not on file   ? Intimate partner violence     Fear of current or ex partner: Not on file     Emotionally abused: Not on file     Physically abused: Not on file     Forced sexual activity: Not on file   Other Topics Concern   ? Not on file   Social History  Narrative   ? Not on file              Lab Results    Chemistry/lipid CBC Cardiac Enzymes/BNP/TSH/INR   Lab Results   Component Value Date    CHOL 188 12/06/2019    HDL 60 12/06/2019    LDLCALC 101 12/06/2019    TRIG 133 12/06/2019    CREATININE 1.00 12/06/2019    BUN 15 12/06/2019    K 4.5 12/06/2019     12/06/2019     12/06/2019    CO2 27 12/06/2019    Lab Results   Component Value Date    WBC 4.6 05/31/2019    HGB 15.2 05/31/2019    HCT 44.8 05/31/2019    MCV 92 05/31/2019     05/31/2019    No results found for: CKTOTAL, CKMB, CKMBINDEX, TROPONINI, BNP, TSH, INR

## 2021-07-03 NOTE — ADDENDUM NOTE
Addendum Note by Edinson Rodriguez MD at 5/31/2019  8:20 AM     Author: Edinson Rodriguez MD Service: -- Author Type: Physician    Filed: 6/3/2019  9:56 AM Encounter Date: 5/31/2019 Status: Signed    : Edinson Rodriguez MD (Physician)    Addended by: EDINSON RODRIGUEZ on: 6/3/2019 09:56 AM        Modules accepted: Orders

## 2021-07-03 NOTE — ADDENDUM NOTE
Addendum Note by Soni Walker CMA at 7/17/2018  8:21 AM     Author: Soni Walker CMA Service: -- Author Type: Certified Medical Assistant    Filed: 7/17/2018  8:21 AM Encounter Date: 7/16/2018 Status: Signed    : Soni Walker CMA (Certified Medical Assistant)    Addended by: SONI WALKER on: 7/17/2018 08:21 AM        Modules accepted: Orders

## 2021-07-03 NOTE — ADDENDUM NOTE
Addendum Note by Edinson Rodriguez MD at 7/17/2018 11:04 AM     Author: Edinson Rodriguez MD Service: -- Author Type: Physician    Filed: 7/17/2018 11:04 AM Encounter Date: 7/16/2018 Status: Signed    : Edinson Rodriguez MD (Physician)    Addended by: EDINSON RODRIGUEZ on: 7/17/2018 11:04 AM        Modules accepted: Orders

## 2021-07-06 VITALS
HEART RATE: 76 BPM | WEIGHT: 220 LBS | DIASTOLIC BLOOD PRESSURE: 70 MMHG | OXYGEN SATURATION: 97 % | SYSTOLIC BLOOD PRESSURE: 120 MMHG | BODY MASS INDEX: 30.8 KG/M2 | HEIGHT: 71 IN

## 2021-07-08 ASSESSMENT — ASTHMA QUESTIONNAIRES: ACT_TOTALSCORE: 22

## 2021-08-04 ENCOUNTER — MYC REFILL (OUTPATIENT)
Dept: FAMILY MEDICINE | Facility: CLINIC | Age: 61
End: 2021-08-04

## 2021-08-04 DIAGNOSIS — J45.31 MILD PERSISTENT ASTHMA WITH ACUTE EXACERBATION: ICD-10-CM

## 2021-08-08 RX ORDER — ALBUTEROL SULFATE 90 UG/1
2 AEROSOL, METERED RESPIRATORY (INHALATION) EVERY 4 HOURS PRN
Qty: 18 G | Refills: 1 | Status: SHIPPED | OUTPATIENT
Start: 2021-08-08 | End: 2022-11-21

## 2021-08-08 NOTE — TELEPHONE ENCOUNTER
" Disp Refills Start End MARLENY   albuterol (PROAIR HFA;PROVENTIL HFA;VENTOLIN HFA) 90 mcg/actuation inhaler 3 Inhaler 3 3/2/2021  No   Sig - Route: Inhale 2 puffs every 4 (four) hours as needed for wheezing or shortness of breath. - Inhalation   Sent to pharmacy as: albuterol sulfate HFA 90 mcg/actuation aerosol inhaler (PROAIR HFA;PROVENTIL HFA;VENTOLIN HFA)   Notes to Pharmacy: May substitute the equivalent medication per insurance preference.   E-Prescribing Status: Receipt confirmed by pharmacy (3/2/2021 10:32 AM CST)     Last Written Prescription Date:  03/02/2021  Last Fill Quantity: 3 inhalers,  # refills: 3   Last office visit provider:  06/09/2021 with Dr Mg.    Pt is requesting script be sent to different pharmacy.    Requested Prescriptions   Pending Prescriptions Disp Refills     albuterol (PROAIR HFA/PROVENTIL HFA/VENTOLIN HFA) 108 (90 Base) MCG/ACT inhaler       Sig: Inhale 2 puffs into the lungs every 4 hours as needed       Asthma Maintenance Inhalers - Anticholinergics Failed - 8/4/2021  6:49 AM        Failed - Recent (6 mo) or future (30 days) visit within the authorizing provider's specialty     Patient had office visit in the last 6 months or has a visit in the next 30 days with authorizing provider or within the authorizing provider's specialty.  See \"Patient Info\" tab in inbasket, or \"Choose Columns\" in Meds & Orders section of the refill encounter.            Passed - Patient is age 12 years or older        Passed - Asthma control assessment score within normal limits in last 6 months     Please review ACT score.           Passed - Medication is active on med list       Short-Acting Beta Agonist Inhalers Protocol  Failed - 8/4/2021  6:49 AM        Failed - Recent (6 mo) or future (30 days) visit within the authorizing provider's specialty     Patient had office visit in the last 6 months or has a visit in the next 30 days with authorizing provider or within the authorizing provider's " "specialty.  See \"Patient Info\" tab in inbasket, or \"Choose Columns\" in Meds & Orders section of the refill encounter.            Passed - Patient is age 12 or older        Passed - Asthma control assessment score within normal limits in last 6 months     Please review ACT score.           Passed - Medication is active on med list             Lily Lucas 08/08/21 12:54 AM  "

## 2021-08-16 ENCOUNTER — MYC MEDICAL ADVICE (OUTPATIENT)
Dept: FAMILY MEDICINE | Facility: CLINIC | Age: 61
End: 2021-08-16

## 2021-08-16 DIAGNOSIS — L30.9 DERMATITIS: Primary | ICD-10-CM

## 2021-08-16 RX ORDER — TRIAMCINOLONE ACETONIDE 1 MG/G
CREAM TOPICAL 2 TIMES DAILY
Qty: 45 G | Refills: 1 | Status: SHIPPED | OUTPATIENT
Start: 2021-08-16 | End: 2022-11-21

## 2021-10-17 ENCOUNTER — HEALTH MAINTENANCE LETTER (OUTPATIENT)
Age: 61
End: 2021-10-17

## 2021-11-24 ENCOUNTER — IMMUNIZATION (OUTPATIENT)
Dept: NURSING | Facility: CLINIC | Age: 61
End: 2021-11-24
Payer: COMMERCIAL

## 2021-11-24 PROCEDURE — 0064A COVID-19,PF,MODERNA (18+ YRS BOOSTER .25ML): CPT

## 2021-11-24 PROCEDURE — 91306 COVID-19,PF,MODERNA (18+ YRS BOOSTER .25ML): CPT

## 2022-06-22 ENCOUNTER — MYC REFILL (OUTPATIENT)
Dept: FAMILY MEDICINE | Facility: CLINIC | Age: 62
End: 2022-06-22

## 2022-06-22 DIAGNOSIS — E78.5 HYPERLIPIDEMIA, UNSPECIFIED HYPERLIPIDEMIA TYPE: ICD-10-CM

## 2022-06-22 DIAGNOSIS — I10 ESSENTIAL HYPERTENSION: ICD-10-CM

## 2022-06-22 RX ORDER — ATORVASTATIN CALCIUM 40 MG/1
40 TABLET, FILM COATED ORAL DAILY
Qty: 90 TABLET | Refills: 0 | OUTPATIENT
Start: 2022-06-22

## 2022-06-22 RX ORDER — LISINOPRIL 10 MG/1
10 TABLET ORAL DAILY
Qty: 90 TABLET | Refills: 0 | Status: CANCELLED | OUTPATIENT
Start: 2022-06-22

## 2022-07-24 ENCOUNTER — HEALTH MAINTENANCE LETTER (OUTPATIENT)
Age: 62
End: 2022-07-24

## 2022-09-29 ENCOUNTER — MYC REFILL (OUTPATIENT)
Dept: FAMILY MEDICINE | Facility: CLINIC | Age: 62
End: 2022-09-29

## 2022-09-29 DIAGNOSIS — E78.5 HYPERLIPIDEMIA, UNSPECIFIED HYPERLIPIDEMIA TYPE: ICD-10-CM

## 2022-09-29 DIAGNOSIS — I10 ESSENTIAL HYPERTENSION: ICD-10-CM

## 2022-09-29 RX ORDER — ATORVASTATIN CALCIUM 40 MG/1
40 TABLET, FILM COATED ORAL DAILY
Qty: 90 TABLET | Refills: 0 | Status: SHIPPED | OUTPATIENT
Start: 2022-09-29 | End: 2022-12-27

## 2022-09-29 RX ORDER — LISINOPRIL 10 MG/1
10 TABLET ORAL DAILY
Qty: 90 TABLET | Refills: 0 | Status: SHIPPED | OUTPATIENT
Start: 2022-09-29 | End: 2022-12-27

## 2022-09-29 NOTE — TELEPHONE ENCOUNTER
"Routing refill request to provider for review/approval because:  Patient needs to be seen because it has been more than 1 year since last office visit.    Last Written Prescription Date:  6/17/22  Last Fill Quantity: 90,  # refills: 0   Last office visit provider:  6/9/21     Requested Prescriptions   Pending Prescriptions Disp Refills     lisinopril (ZESTRIL) 10 MG tablet 90 tablet 0     Sig: Take 1 tablet (10 mg) by mouth daily       ACE Inhibitors (Including Combos) Protocol Failed - 9/29/2022  8:19 AM        Failed - Blood pressure under 140/90 in past 12 months     BP Readings from Last 3 Encounters:   06/09/21 120/70   11/09/20 118/60   12/06/19 120/60                 Failed - Recent (12 mo) or future (30 days) visit within the authorizing provider's specialty     Patient has had an office visit with the authorizing provider or a provider within the authorizing providers department within the previous 12 mos or has a future within next 30 days. See \"Patient Info\" tab in inbasket, or \"Choose Columns\" in Meds & Orders section of the refill encounter.              Failed - Normal serum creatinine on file in past 12 months     Recent Labs   Lab Test 06/14/21  1154   CR 0.95       Ok to refill medication if creatinine is low          Failed - Normal serum potassium on file in past 12 months     Recent Labs   Lab Test 06/14/21  1154   POTASSIUM 4.4             Passed - Medication is active on med list        Passed - Patient is age 18 or older           atorvastatin (LIPITOR) 40 MG tablet 90 tablet 0     Sig: Take 1 tablet (40 mg) by mouth daily       Statins Protocol Failed - 9/29/2022  8:19 AM        Failed - LDL on file in past 12 months     Recent Labs   Lab Test 06/14/21  1154   LDL 87             Failed - Recent (12 mo) or future (30 days) visit within the authorizing provider's specialty     Patient has had an office visit with the authorizing provider or a provider within the authorizing providers department " "within the previous 12 mos or has a future within next 30 days. See \"Patient Info\" tab in inbasket, or \"Choose Columns\" in Meds & Orders section of the refill encounter.              Passed - No abnormal creatine kinase in past 12 months     No lab results found.             Passed - Medication is active on med list        Passed - Patient is age 18 or older             Dominique Gamboa RN 09/29/22 12:02 PM  "

## 2022-10-02 ENCOUNTER — HEALTH MAINTENANCE LETTER (OUTPATIENT)
Age: 62
End: 2022-10-02

## 2022-10-14 DIAGNOSIS — H10.13 ALLERGIC CONJUNCTIVITIS OF BOTH EYES: ICD-10-CM

## 2022-10-14 RX ORDER — OLOPATADINE HYDROCHLORIDE 1 MG/ML
SOLUTION/ DROPS OPHTHALMIC
Qty: 15 ML | Refills: 3 | Status: SHIPPED | OUTPATIENT
Start: 2022-10-14 | End: 2024-01-22

## 2022-10-14 NOTE — TELEPHONE ENCOUNTER
"Routing refill request to provider for review/approval because:  Patient needs to be seen because it has been more than 1 year since last office visit.    Last Written Prescription Date:  6/9/21  Last Fill Quantity: 15,  # refills: 3   Last office visit provider:  6/9/21     Requested Prescriptions   Pending Prescriptions Disp Refills     olopatadine (PATANOL) 0.1 % ophthalmic solution [Pharmacy Med Name: OLOPATADINE OPTH SOLN 5ML 0.1% 0.1%] 15 mL 3     Sig: INSTILL 1 DROP IN BOTH EYES TWICE A DAY       Miscellaneous Opthalmic Allergy Drops Protocol Failed - 10/14/2022 10:24 AM        Failed - Recent (12 mo) or future (30 days) visit within the authorizing provider's specialty     Patient has had an office visit with the authorizing provider or a provider within the authorizing providers department within the previous 12 mos or has a future within next 30 days. See \"Patient Info\" tab in inbasket, or \"Choose Columns\" in Meds & Orders section of the refill encounter.              Passed - Patient is age 4 or older        Passed - Medication is active on med list             Dominique Gamboa, RN 10/14/22 1:57 PM  "

## 2022-11-21 ENCOUNTER — MYC MEDICAL ADVICE (OUTPATIENT)
Dept: FAMILY MEDICINE | Facility: CLINIC | Age: 62
End: 2022-11-21

## 2022-11-21 DIAGNOSIS — L30.9 DERMATITIS: ICD-10-CM

## 2022-11-21 DIAGNOSIS — J45.31 MILD PERSISTENT ASTHMA WITH ACUTE EXACERBATION: ICD-10-CM

## 2022-11-21 RX ORDER — TRIAMCINOLONE ACETONIDE 1 MG/G
CREAM TOPICAL 2 TIMES DAILY
Qty: 45 G | Refills: 1 | Status: SHIPPED | OUTPATIENT
Start: 2022-11-21 | End: 2024-04-23

## 2022-11-21 RX ORDER — ALBUTEROL SULFATE 90 UG/1
AEROSOL, METERED RESPIRATORY (INHALATION)
Qty: 18 G | Refills: 2 | OUTPATIENT
Start: 2022-11-21 | End: 2024-08-08

## 2022-11-21 RX ORDER — ALBUTEROL SULFATE 90 UG/1
AEROSOL, METERED RESPIRATORY (INHALATION)
Qty: 18 G | Refills: 2 | Status: SHIPPED | OUTPATIENT
Start: 2022-11-21 | End: 2024-01-22

## 2022-12-27 ENCOUNTER — OFFICE VISIT (OUTPATIENT)
Dept: FAMILY MEDICINE | Facility: CLINIC | Age: 62
End: 2022-12-27
Payer: COMMERCIAL

## 2022-12-27 VITALS
HEIGHT: 71 IN | OXYGEN SATURATION: 100 % | SYSTOLIC BLOOD PRESSURE: 110 MMHG | BODY MASS INDEX: 30.1 KG/M2 | DIASTOLIC BLOOD PRESSURE: 60 MMHG | WEIGHT: 215 LBS | HEART RATE: 55 BPM

## 2022-12-27 DIAGNOSIS — R73.01 IMPAIRED FASTING GLUCOSE: ICD-10-CM

## 2022-12-27 DIAGNOSIS — E66.09 CLASS 1 OBESITY DUE TO EXCESS CALORIES WITH SERIOUS COMORBIDITY AND BODY MASS INDEX (BMI) OF 30.0 TO 30.9 IN ADULT: ICD-10-CM

## 2022-12-27 DIAGNOSIS — Z12.11 SCREEN FOR COLON CANCER: ICD-10-CM

## 2022-12-27 DIAGNOSIS — E66.811 CLASS 1 OBESITY DUE TO EXCESS CALORIES WITH SERIOUS COMORBIDITY AND BODY MASS INDEX (BMI) OF 30.0 TO 30.9 IN ADULT: ICD-10-CM

## 2022-12-27 DIAGNOSIS — Z00.00 ROUTINE PHYSICAL EXAMINATION: Primary | ICD-10-CM

## 2022-12-27 DIAGNOSIS — Z23 ENCOUNTER FOR IMMUNIZATION: ICD-10-CM

## 2022-12-27 DIAGNOSIS — M76.62 LEFT ACHILLES TENDINITIS: ICD-10-CM

## 2022-12-27 DIAGNOSIS — I10 ESSENTIAL HYPERTENSION: ICD-10-CM

## 2022-12-27 DIAGNOSIS — L71.9 ACNE ROSACEA: ICD-10-CM

## 2022-12-27 DIAGNOSIS — Z12.5 SCREENING FOR PROSTATE CANCER: ICD-10-CM

## 2022-12-27 DIAGNOSIS — J30.9 ALLERGIC RHINITIS, UNSPECIFIED SEASONALITY, UNSPECIFIED TRIGGER: ICD-10-CM

## 2022-12-27 DIAGNOSIS — E78.5 HYPERLIPIDEMIA, UNSPECIFIED HYPERLIPIDEMIA TYPE: ICD-10-CM

## 2022-12-27 DIAGNOSIS — J45.31 MILD PERSISTENT ASTHMA WITH ACUTE EXACERBATION: ICD-10-CM

## 2022-12-27 DIAGNOSIS — M79.645 PAIN OF FINGER OF LEFT HAND: ICD-10-CM

## 2022-12-27 DIAGNOSIS — M1A.9XX1 CHRONIC GOUT WITH TOPHUS, UNSPECIFIED CAUSE, UNSPECIFIED SITE: ICD-10-CM

## 2022-12-27 DIAGNOSIS — N40.0 BPH WITHOUT URINARY OBSTRUCTION: ICD-10-CM

## 2022-12-27 LAB
ANION GAP SERPL CALCULATED.3IONS-SCNC: 14 MMOL/L (ref 7–15)
BUN SERPL-MCNC: 19.6 MG/DL (ref 8–23)
CALCIUM SERPL-MCNC: 9.9 MG/DL (ref 8.8–10.2)
CHLORIDE SERPL-SCNC: 102 MMOL/L (ref 98–107)
CHOLEST SERPL-MCNC: 155 MG/DL
CREAT SERPL-MCNC: 0.92 MG/DL (ref 0.67–1.17)
DEPRECATED HCO3 PLAS-SCNC: 24 MMOL/L (ref 22–29)
ERYTHROCYTE [DISTWIDTH] IN BLOOD BY AUTOMATED COUNT: 12.5 % (ref 10–15)
GFR SERPL CREATININE-BSD FRML MDRD: >90 ML/MIN/1.73M2
GLUCOSE SERPL-MCNC: 92 MG/DL (ref 70–99)
HBA1C MFR BLD: 5.3 % (ref 0–5.6)
HCT VFR BLD AUTO: 37.4 % (ref 40–53)
HDLC SERPL-MCNC: 57 MG/DL
HGB BLD-MCNC: 13.4 G/DL (ref 13.3–17.7)
LDLC SERPL CALC-MCNC: 72 MG/DL
MCH RBC QN AUTO: 30.2 PG (ref 26.5–33)
MCHC RBC AUTO-ENTMCNC: 35.8 G/DL (ref 31.5–36.5)
MCV RBC AUTO: 84 FL (ref 78–100)
NONHDLC SERPL-MCNC: 98 MG/DL
PLATELET # BLD AUTO: 204 10E3/UL (ref 150–450)
POTASSIUM SERPL-SCNC: 4.4 MMOL/L (ref 3.4–5.3)
PSA SERPL-MCNC: 0.81 NG/ML (ref 0–4.5)
RBC # BLD AUTO: 4.44 10E6/UL (ref 4.4–5.9)
SODIUM SERPL-SCNC: 140 MMOL/L (ref 136–145)
TRIGL SERPL-MCNC: 131 MG/DL
URATE SERPL-MCNC: 5.5 MG/DL (ref 3.4–7)
WBC # BLD AUTO: 3.8 10E3/UL (ref 4–11)

## 2022-12-27 PROCEDURE — 85027 COMPLETE CBC AUTOMATED: CPT | Performed by: FAMILY MEDICINE

## 2022-12-27 PROCEDURE — G0103 PSA SCREENING: HCPCS | Performed by: FAMILY MEDICINE

## 2022-12-27 PROCEDURE — 90471 IMMUNIZATION ADMIN: CPT | Performed by: FAMILY MEDICINE

## 2022-12-27 PROCEDURE — 36415 COLL VENOUS BLD VENIPUNCTURE: CPT | Performed by: FAMILY MEDICINE

## 2022-12-27 PROCEDURE — 90677 PCV20 VACCINE IM: CPT | Performed by: FAMILY MEDICINE

## 2022-12-27 PROCEDURE — 80061 LIPID PANEL: CPT | Performed by: FAMILY MEDICINE

## 2022-12-27 PROCEDURE — 99214 OFFICE O/P EST MOD 30 MIN: CPT | Mod: 25 | Performed by: FAMILY MEDICINE

## 2022-12-27 PROCEDURE — 80048 BASIC METABOLIC PNL TOTAL CA: CPT | Performed by: FAMILY MEDICINE

## 2022-12-27 PROCEDURE — 99396 PREV VISIT EST AGE 40-64: CPT | Mod: 25 | Performed by: FAMILY MEDICINE

## 2022-12-27 PROCEDURE — 83036 HEMOGLOBIN GLYCOSYLATED A1C: CPT | Performed by: FAMILY MEDICINE

## 2022-12-27 PROCEDURE — 84550 ASSAY OF BLOOD/URIC ACID: CPT | Performed by: FAMILY MEDICINE

## 2022-12-27 RX ORDER — LISINOPRIL 10 MG/1
10 TABLET ORAL DAILY
Qty: 90 TABLET | Refills: 3 | Status: SHIPPED | OUTPATIENT
Start: 2022-12-27 | End: 2024-01-22

## 2022-12-27 RX ORDER — ATORVASTATIN CALCIUM 40 MG/1
40 TABLET, FILM COATED ORAL DAILY
Qty: 90 TABLET | Refills: 3 | Status: SHIPPED | OUTPATIENT
Start: 2022-12-27 | End: 2024-01-22

## 2022-12-27 RX ORDER — TAMSULOSIN HYDROCHLORIDE 0.4 MG/1
0.4 CAPSULE ORAL DAILY
Qty: 90 CAPSULE | Refills: 3 | Status: SHIPPED | OUTPATIENT
Start: 2022-12-27 | End: 2024-03-22

## 2022-12-27 RX ORDER — TAMSULOSIN HYDROCHLORIDE 0.4 MG/1
0.4 CAPSULE ORAL DAILY
Qty: 90 CAPSULE | Refills: 3 | Status: SHIPPED | OUTPATIENT
Start: 2022-12-27 | End: 2022-12-27

## 2022-12-27 ASSESSMENT — ENCOUNTER SYMPTOMS
PALPITATIONS: 0
HEARTBURN: 1
PARESTHESIAS: 1
MYALGIAS: 0
NERVOUS/ANXIOUS: 0
WEAKNESS: 0
ARTHRALGIAS: 1
EYE PAIN: 0
HEADACHES: 0
DIZZINESS: 0
ABDOMINAL PAIN: 0
SORE THROAT: 0
CHILLS: 0
SHORTNESS OF BREATH: 1
CONSTIPATION: 1
DIARRHEA: 0
FREQUENCY: 0
COUGH: 0
HEMATURIA: 0
NAUSEA: 0
HEMATOCHEZIA: 0
DYSURIA: 0
JOINT SWELLING: 1
FEVER: 0

## 2022-12-27 ASSESSMENT — ASTHMA QUESTIONNAIRES
ACT_TOTALSCORE: 22
QUESTION_5 LAST FOUR WEEKS HOW WOULD YOU RATE YOUR ASTHMA CONTROL: COMPLETELY CONTROLLED
ACT_TOTALSCORE: 22
QUESTION_2 LAST FOUR WEEKS HOW OFTEN HAVE YOU HAD SHORTNESS OF BREATH: ONCE OR TWICE A WEEK
QUESTION_4 LAST FOUR WEEKS HOW OFTEN HAVE YOU USED YOUR RESCUE INHALER OR NEBULIZER MEDICATION (SUCH AS ALBUTEROL): TWO OR THREE TIMES PER WEEK
QUESTION_1 LAST FOUR WEEKS HOW MUCH OF THE TIME DID YOUR ASTHMA KEEP YOU FROM GETTING AS MUCH DONE AT WORK, SCHOOL OR AT HOME: NONE OF THE TIME
QUESTION_3 LAST FOUR WEEKS HOW OFTEN DID YOUR ASTHMA SYMPTOMS (WHEEZING, COUGHING, SHORTNESS OF BREATH, CHEST TIGHTNESS OR PAIN) WAKE YOU UP AT NIGHT OR EARLIER THAN USUAL IN THE MORNING: NOT AT ALL

## 2022-12-27 NOTE — PROGRESS NOTES
Assessment/Plan:     Routine physical examination  Routine healthcare maintenance.  Preventative cares reviewed.  Annual physical exams to continue.    Class 1 obesity due to excess calories with serious comorbidity and body mass index (BMI) of 30.0 to 30.9 in adult  Dietary and exercise modification for weight goal less than 210 pounds initially, less than 200 pounds ideally.    Mild persistent asthma with acute exacerbation  Patient with mild persistent asthma history.  States Symbicort does not work and does not want to continue this.  Not covered by formulary change apparently either.  Has albuterol MDI.  Has used prednisone in the past infrequently.    Screen for colon cancer  Colonoscopy to be completed at earliest convenience with prior colonoscopy January 23, 2013.  - Colonoscopy Screening  Referral    Essential hypertension  Hypertension noted.  Refill lisinopril 10 mg daily with med monitoring completed  - Basic metabolic panel  - lisinopril (ZESTRIL) 10 MG tablet  Dispense: 90 tablet; Refill: 3  - Basic metabolic panel    Hyperlipidemia, unspecified hyperlipidemia type  Utilizing atorvastatin 40 mg daily for lipid management.  - Lipid panel reflex to direct LDL Non-fasting  - atorvastatin (LIPITOR) 40 MG tablet  Dispense: 90 tablet; Refill: 3  - Lipid panel reflex to direct LDL Non-fasting    Impaired fasting glucose  A1c updated today.  - Hemoglobin A1c  - Hemoglobin A1c    Chronic gout with tophus, unspecified cause, unspecified site  Chronic gout.  Check uric acid and CBC.  Denies recent exacerbation however when it does occur often seems to affect his left Achilles tendon.  - Uric acid  - CBC with platelets  - Uric acid  - CBC with platelets    Allergic rhinitis, unspecified seasonality, unspecified trigger  Allergic rhinitis.  Utilizing chronic allergy medication.    Encounter for immunization  Pneumococcal 20 vaccination provided today.  - Pneumococcal 20 Valent Conjugate (Prevnar  "20)    Screening for prostate cancer  PSA for prostate cancer screening completed.  - Prostate Specific Antigen Screen  - Prostate Specific Antigen Screen    BPH without urinary obstruction  BPH with urinary obstruction and will initiate tamsulosin 0.4 mg daily.  Use minimum of 90 days to determine desired improvement.  - tamsulosin (FLOMAX) 0.4 MG capsule  Dispense: 90 capsule; Refill: 3    Left Achilles tendinitis  Left Achilles tendinitis described.  Podiatry referral for further assessment after injury of approximately 4 years ago.  - Orthopedic  Referral    Acne rosacea  Follows with dermatology considering laser treatment.  States topical product did not help previously.    Pain of finger of left hand  Left hand distal middle finger pain described with paresthesias, localized.  Avoidance of exacerbating triggers.           Subjective:     Obdulio Ambrocio is a 62 year old male who presents for an annual exam.  In general doing well.  Left middle finger seems to bother him if he hits it just right causing shooting pain.  Patient also has ongoing left Achilles tendon concerns over past 4 years since prior injury.  States can have gout symptoms in this area that are improved with gout treatment when utilizing.  Mild persistent asthma without benefit of Symbicort and wants to stop this since no longer on formulary either.  Wants to use albuterol MDI on as-needed basis.  Has used prednisone apparently in the past.  Continues atorvastatin 40 mg daily for lipid management and lisinopril 10 mg daily for hypertension.  Allergy management.  Gets up at least 3 times a night to urinate with decreased stream noted.  Acne rosacea followed by dermatologist.  Topical products have not helped.  Considering use of a laser treatment.  Comprehensive review of systems as above otherwise all negative.     \"Daphne\"   2 daughters -   Construction management - escalator work, prior elevator work   No smoke   Occ EtOH " "  Mom -  around age 76 or 77 with h/o bleeding d/o and \"bleeding out\"  Dad -  87 due to \" in his sleep\"; HTN; bladder cancer  2 bros - bleeding d/o in one of them  4 sis - bleeding d/o with two of them  No surgeries/hospitalizations   Tdap 08 - cholesterol 211, HDL39   Peak Flow Max = 650      Healthy Habits:     Getting at least 3 servings of Calcium per day:  NO    Bi-annual eye exam:  Yes    Dental care twice a year:  Yes    Sleep apnea or symptoms of sleep apnea:  None    Diet:  Regular (no restrictions)    Frequency of exercise:  None    Taking medications regularly:  Yes    Medication side effects:  None    PHQ-2 Total Score: 0    Additional concerns today:  No       Immunization History   Administered Date(s) Administered     COVID-19 Vaccine 18+ (Moderna) 2021, 2021     COVID-19 Vaccine Bivalent Booster 18+ (Moderna) 2022     COVID-19,PF,Moderna Booster 2021     Flu, Unspecified 10/01/2016, 10/20/2017, 10/31/2019     HepA, Unspecified 2002, 2005     Influenza Vaccine >6 months (Alfuria,Fluzone) 2013, 2022     Influenza Vaccine, 6+MO IM (QUADRIVALENT W/PRESERVATIVES) 2009     Pneumococcal 20 valent Conjugate (Prevnar 20) 2022     Pneumococcal 23 valent 2017     TD (ADULT, 7+) 2018     Td,adult,historic,unspecified 06/10/1998     Tdap (Adacel,Boostrix) 2008     Zoster vaccine recombinant adjuvanted (SHINGRIX) 2019, 2020     Immunization status: Pneumococcal 20 vaccine provided today otherwise immunizations are up-to-date.    Current Outpatient Medications   Medication Sig Dispense Refill     albuterol (PROAIR HFA/PROVENTIL HFA/VENTOLIN HFA) 108 (90 Base) MCG/ACT inhaler Inhale 2 puffs every 4 (four) hours as needed for wheezing or shortness of breath. Disp 3 inhalers. 18 g 2     atorvastatin (LIPITOR) 40 MG tablet Take 1 tablet (40 mg) by mouth daily 90 tablet 3     " clotrimazole-betamethasone (LOTRISONE) cream [CLOTRIMAZOLE-BETAMETHASONE (LOTRISONE) CREAM] apply topically sparingly to affected areas twice daily as needed 45 g 2     fluticasone propionate (FLONASE) 50 mcg/actuation nasal spray [FLUTICASONE PROPIONATE (FLONASE) 50 MCG/ACTUATION NASAL SPRAY] Apply 2 sprays into each nostril daily. 48 g 3     indomethacin (INDOCIN) 50 MG capsule [INDOMETHACIN (INDOCIN) 50 MG CAPSULE] 50 mg three times per day x 3-5 days as needed for gout flare 60 capsule 3     lisinopril (ZESTRIL) 10 MG tablet Take 1 tablet (10 mg) by mouth daily 90 tablet 3     olopatadine (PATANOL) 0.1 % ophthalmic solution INSTILL 1 DROP IN BOTH EYES TWICE A DAY 15 mL 3     tamsulosin (FLOMAX) 0.4 MG capsule Take 1 capsule (0.4 mg) by mouth daily 90 capsule 3     triamcinolone (KENALOG) 0.1 % external cream Apply topically 2 times daily 45 g 1     Past Medical History:   Diagnosis Date     Asthma      Hyperlipidemia      Hypertension      Past Surgical History:   Procedure Laterality Date     TONSILLECTOMY       Grass and Pollen [pollen extract]  Family History   Problem Relation Age of Onset     Leukemia Mother      Coronary Artery Disease Father      Social History     Socioeconomic History     Marital status:      Spouse name: Not on file     Number of children: Not on file     Years of education: Not on file     Highest education level: Not on file   Occupational History     Not on file   Tobacco Use     Smoking status: Never     Smokeless tobacco: Never   Substance and Sexual Activity     Alcohol use: Yes     Alcohol/week: 5.0 - 6.0 standard drinks     Drug use: No     Sexual activity: Not on file   Other Topics Concern     Not on file   Social History Narrative     Not on file     Social Determinants of Health     Financial Resource Strain: Not on file   Food Insecurity: Not on file   Transportation Needs: Not on file   Physical Activity: Not on file   Stress: Not on file   Social Connections: Not  "on file   Intimate Partner Violence: Not on file   Housing Stability: Not on file       Review of Systems  Comprehensive ROS: as above, otherwise all negative.           Objective:     /60   Pulse 55   Ht 1.791 m (5' 10.5\")   Wt 97.5 kg (215 lb)   SpO2 100%   BMI 30.41 kg/m    Body mass index is 30.41 kg/m .    Physical    General Appearance:    Alert, cooperative, no distress, appears stated age.     Head:    Normocephalic, without obvious abnormality, atraumatic   Eyes:    PERRL, conjunctiva/corneas clear, EOM's intact, fundi     benign, both eyes        Ears:    Normal TM's and external ear canals, both ears   Nose:   Nares normal, septum midline, mucosa normal, no drainage    or sinus tenderness   Throat:   Lips, mucosa, and tongue normal; teeth and gums normal   Neck:   Supple, symmetrical, trachea midline, no adenopathy;        thyroid:  No enlargement/tenderness/nodules; no carotid    bruit or JVD   Back:     Symmetric, no curvature, ROM normal, no CVA tenderness   Lungs:     Clear to auscultation bilaterally, respirations unlabored   Chest wall:    No tenderness or deformity   Heart:    Regular rate and rhythm, S1 and S2 normal, no murmur, rub   or gallop   Abdomen:     Soft, non-tender, bowel sounds active all four quadrants,     no masses, no organomegaly.     Genitalia:    Normal male without lesion, discharge or tenderness.  No inguinal hernia noted.     Rectal:    Normal tone.  Prostate normal/symmetric, no masses or tenderness.   Extremities:   Extremities normal, atraumatic, no cyanosis or edema   Pulses:   2+ and symmetric all extremities   Skin:   Skin color, texture, turgor normal, no rashes or lesions   Lymph nodes:   Cervical, supraclavicular, and axillary nodes normal   Neurologic:   CNII-XII intact. Normal strength, sensation and reflexes       throughout                This note has been dictated using voice recognition software and as a result may contain minor grammatical errors " and unintended word substitutions.   Answers for HPI/ROS submitted by the patient on 12/27/2022  abdominal pain: No  Blood in stool: No  Blood in urine: No  chest pain: No  chills: No  congestion: No  constipation: Yes  cough: No  diarrhea: No  dizziness: No  ear pain: No  eye pain: No  nervous/anxious: No  fever: No  frequency: No  genital sores: No  headaches: No  hearing loss: Yes  heartburn: Yes  arthralgias: Yes  joint swelling: Yes  peripheral edema: No  mood changes: No  myalgias: No  nausea: No  dysuria: No  palpitations: No  Skin sensation changes: Yes  sore throat: No  urgency: No  rash: Yes  shortness of breath: Yes  visual disturbance: No  weakness: No  impotence: Yes  penile discharge: No

## 2023-01-05 ENCOUNTER — OFFICE VISIT (OUTPATIENT)
Dept: PODIATRY | Facility: CLINIC | Age: 63
End: 2023-01-05
Attending: FAMILY MEDICINE
Payer: COMMERCIAL

## 2023-01-05 VITALS — HEART RATE: 71 BPM | BODY MASS INDEX: 30.1 KG/M2 | WEIGHT: 215 LBS | OXYGEN SATURATION: 90 % | HEIGHT: 71 IN

## 2023-01-05 DIAGNOSIS — M76.62 LEFT ACHILLES TENDINITIS: ICD-10-CM

## 2023-01-05 PROCEDURE — 99243 OFF/OP CNSLTJ NEW/EST LOW 30: CPT | Performed by: PODIATRIST

## 2023-01-05 RX ORDER — METHYLPREDNISOLONE 4 MG
TABLET, DOSE PACK ORAL
Qty: 21 TABLET | Refills: 0 | Status: SHIPPED | OUTPATIENT
Start: 2023-01-05 | End: 2024-05-03

## 2023-01-05 RX ORDER — METHYLPREDNISOLONE 4 MG
TABLET, DOSE PACK ORAL
Qty: 21 TABLET | Refills: 0 | Status: SHIPPED | OUTPATIENT
Start: 2023-01-05 | End: 2023-01-05

## 2023-01-05 ASSESSMENT — PAIN SCALES - GENERAL: PAINLEVEL: MILD PAIN (2)

## 2023-01-05 NOTE — LETTER
1/5/2023         RE: Obdulio Ambrocio  [address]  [place] [address]        Dear Colleague,    Thank you for referring your patient, Obdulio Ambrocio, to the [facility]. Please see a copy of my visit note below.    FOOT AND ANKLE SURGERY/PODIATRY CONSULT NOTE         ASSESSMENT:   Achilles tendinitis tendinitis left lower extremity      TREATMENT:  The patient was placed in the cam boot.  He is to ambulate in a cam boot at all times.  He was started on methylprednisolone.  I have also referred the patient to radiology for an MRI of the left Achilles tendon.  The patient is to return to the clinic in 1 week to discuss the findings of the MRI.  I informed the patient that if there are no structural changes to the tendon I would recommend he ambulate in the cam boot for least 8 weeks.        HPI: I was asked to see Obdulio Ambrocio today to evaluate and treat a painful left Achilles tendon.  The patient stated that he injured his Achilles tendon a probably 5 years ago.  Since that time he has had some pain and swelling along the course of the tendon.  The pain is an aching type pain which is aggravated with weightbearing and ambulation.  The pain does improve with rest.  He has not had any associated redness with the symptoms.  He denies any other previous treatment.  Symptoms the patient was seen in consultation at the request of Hardik Jones MD for evaluation and treatment of painful Achilles tendon left lower extremity..     Past Medical History:   Diagnosis Date     Asthma      Hyperlipidemia      Hypertension        Social History     Socioeconomic History     Marital status:      Spouse name: Not on file     Number of children: Not on file     Years of education: Not on file     Highest education level: Not on file   Occupational History     Not on file   Tobacco Use     Smoking status: Never     Smokeless tobacco: Never   Substance and Sexual Activity     Alcohol use: Yes      Alcohol/week: 5.0 - 6.0 standard drinks     Drug use: No     Sexual activity: Not on file   Other Topics Concern     Not on file   Social History Narrative     Not on file     Social Determinants of Health     Financial Resource Strain: Not on file   Food Insecurity: Not on file   Transportation Needs: Not on file   Physical Activity: Not on file   Stress: Not on file   Social Connections: Not on file   Intimate Partner Violence: Not on file   Housing Stability: Not on file          Allergies   Allergen Reactions     Grass Unknown     Pollen [Pollen Extract] Unknown          Current Outpatient Medications:      albuterol (PROAIR HFA/PROVENTIL HFA/VENTOLIN HFA) 108 (90 Base) MCG/ACT inhaler, Inhale 2 puffs every 4 (four) hours as needed for wheezing or shortness of breath. Disp 3 inhalers., Disp: 18 g, Rfl: 2     atorvastatin (LIPITOR) 40 MG tablet, Take 1 tablet (40 mg) by mouth daily, Disp: 90 tablet, Rfl: 3     clotrimazole-betamethasone (LOTRISONE) cream, [CLOTRIMAZOLE-BETAMETHASONE (LOTRISONE) CREAM] apply topically sparingly to affected areas twice daily as needed, Disp: 45 g, Rfl: 2     fluticasone propionate (FLONASE) 50 mcg/actuation nasal spray, [FLUTICASONE PROPIONATE (FLONASE) 50 MCG/ACTUATION NASAL SPRAY] Apply 2 sprays into each nostril daily., Disp: 48 g, Rfl: 3     indomethacin (INDOCIN) 50 MG capsule, [INDOMETHACIN (INDOCIN) 50 MG CAPSULE] 50 mg three times per day x 3-5 days as needed for gout flare, Disp: 60 capsule, Rfl: 3     lisinopril (ZESTRIL) 10 MG tablet, Take 1 tablet (10 mg) by mouth daily, Disp: 90 tablet, Rfl: 3     olopatadine (PATANOL) 0.1 % ophthalmic solution, INSTILL 1 DROP IN BOTH EYES TWICE A DAY, Disp: 15 mL, Rfl: 3     tamsulosin (FLOMAX) 0.4 MG capsule, Take 1 capsule (0.4 mg) by mouth daily, Disp: 90 capsule, Rfl: 3     triamcinolone (KENALOG) 0.1 % external cream, Apply topically 2 times daily, Disp: 45 g, Rfl: 1     Family History   Problem Relation Age of Onset     Leukemia  Mother      Coronary Artery Disease Father         Social History     Socioeconomic History     Marital status:      Spouse name: Not on file     Number of children: Not on file     Years of education: Not on file     Highest education level: Not on file   Occupational History     Not on file   Tobacco Use     Smoking status: Never     Smokeless tobacco: Never   Substance and Sexual Activity     Alcohol use: Yes     Alcohol/week: 5.0 - 6.0 standard drinks     Drug use: No     Sexual activity: Not on file   Other Topics Concern     Not on file   Social History Narrative     Not on file     Social Determinants of Health     Financial Resource Strain: Not on file   Food Insecurity: Not on file   Transportation Needs: Not on file   Physical Activity: Not on file   Stress: Not on file   Social Connections: Not on file   Intimate Partner Violence: Not on file   Housing Stability: Not on file        Review of Systems - Patient denies fever, chills, rash, wound, stiffness, limping, numbness, weakness, heart burn, blood in stool, chest pain with activity, calf pain when walking, shortness of breath with activity, chronic cough, easy bleeding/bruising, swelling of ankles, excessive thirst, fatigue, depression, anxiety.  Patient admits to painful Achilles tendon left.      OBJECTIVE:  Appearance: alert, well appearing, and in no distress.    There were no vitals taken for this visit.     There is no height or weight on file to calculate BMI.     General appearance: Patient is alert and fully cooperative with history & exam.  No sign of distress is noted during the visit.  Psychiatric: Affect is pleasant & appropriate.  Patient appears motivated to improve health.  Respiratory: Breathing is regular & unlabored while sitting.  HEENT: Hearing is intact to spoken word.  Speech is clear.  No gross evidence of visual impairment that would impact ambulation.    Vascular: Dorsalis pedis and posterior tibial pulses are palpable.  There is pedal hair growth bilaterally.  CFT < 3 sec from anterior tibial surface to distal digits bilaterally. There is no appreciable edema noted.  Dermatologic: Turgor and texture are within normal limits. No coloration or temperature changes. No primary or secondary lesions noted.  Neurologic: All epicritic and proprioceptive sensations are grossly intact bilaterally.  Musculoskeletal: All active and passive ankle, subtalar, midtarsal, and 1st MPJ range of motion are grossly intact without pain or crepitus, with the exception of none. Manual muscle strength is within normal limits bilaterally. All dorsiflexors, plantarflexors, invertors, evertors are intact bilaterally. Tenderness present to left Achilles tendon on palpation.  Mild tenderness to left Achilles tendon with range of motion.  Moderate edema noted left Achilles tendon calf is soft/non-tender ithout warmth/induration    Imaging:       No images are attached to the encounter or orders placed in the encounter.     No results found.   No results found.       Kyler Lay DPM  [facility] Foot & Ankle Surgery/Podiatry         Again, thank you for allowing me to participate in the care of your patient.        Sincerely,        Kyler Serna DPM

## 2023-01-05 NOTE — PATIENT INSTRUCTIONS
Magnetic Resonance Imaging (MRI)     You will be asked to hold very still during the scan.     Magnetic resonance imaging (MRI) is a test that lets your doctor see detailed pictures of the inside of your body. MRI combines the use of strong magnets and radio waves to form an MRI image.  How do I get ready for an MRI?  Follow any directions you are given for not eating or drinking before the test.  Ask your provider if you should stop taking any medicine before the test.  Follow your normal daily routine unless your provider tells you otherwise.  You'll be asked to remove your watch, jewelry, hearing aids, credit cards, pens, pocket knives, eyeglasses, and other metal objects.  You may be asked to remove your makeup. Makeup may contain some metal.  Most MRI tests take 30 to 60 minutes. Depending on the type of MRI you are having, the test may take longer. Give yourself extra time to check in.      MRI uses strong magnets. Metal is affected by magnets and can distort the image. The magnet used in MRI can cause metal objects in your body to move. If you have a metal implant, you may not be able to have an MRI unless the implant is certified as MRI safe. People with these implants should not have an MRI:  Ear (cochlear) implants  Certain clips used for brain aneurysms  Certain metal coils put in blood vessels  Most defibrillators  Most pacemakers  Be sure to tell the radiologist or technologist if you:  Have had any previous surgeries  Have a pacemaker, surgical clips, metal plate or pins, an artificial joint, staples or screws, ear (cochlear) implants, or other implants  Wear a medicated adhesive patch  Have metal splinters in your body  Have implanted nerve stimulators or drug-infusion ports  Have tattoos or body piercings. Some tattoo inks contain metal.  Work with metal  Have braces. You must remove any dental work.  Have a bullet or other metal in your body  Also tell the radiologist or technologist if you:  Are  pregnant or think you may be  Are afraid of small, enclosed spaces (claustrophobic)  Are allergic to X-ray dye (contrast medium), iodine, shellfish, or any medicines  Have other allergies  Are breastfeeding  Have a history of cancer  Have any serious health problems. This includes kidney disease or a liver transplant. You may not be able to have the contrast material used for MRI.   What happens during an MRI?  You may be asked to wear a hospital gown.  You may be given earplugs to wear if you need them.  You may be injected with a special dye (contrast) that improves the MRI image.   You ll lie down on a platform that slides into the magnet.  What happens after an MRI?  You can get back to normal activities right away. If you were given contrast, it will pass naturally through your body within a day. You may be told to drink more water or other fluids during this time.   Your doctor will discuss the test results with you during a follow-up appointment or over the phone.  Your next appointment is: __________________  Date Last Reviewed: 6/1/2017 2000-2017 [email or web address]. [address] [place] [address]. All rights reserved. This information is not intended as a substitute for professional medical care. Always follow your healthcare professional's instructions.           [facility]  [address] [address]   Monday through Friday 7 am to 10 pm, Saturday and Sunday 8 am to 4:40 pm   P: [phone]    [facility] [address] [place] [address]   Monday through Friday 7 am to 7 pm/ P: [phone]    [facility] [address] [place] [address]   Monday through Friday 7 am to 5 pm, Saturday 9 am to 1 pm/ P: [phone]     [facility] [address] [place] [address]   Monday through Friday 7 am to 6:30pm/ P: [phone]     WHAT YOU NEED TO KNOW:    What is a walking boot?  A walking boot is a type of medical  shoe used to protect the foot and ankle after an injury or surgery. The boot can be used for broken bones, tendon injuries, severe sprains, or shin splints. A walking boot helps keep the foot stable so it can heal. It can keep your weight off an area, such as your toe, as it heals. Most boots have between 2 and 5 adjustable straps and go mid-way up your calf.              How do I put on the walking boot?  You may want to wear a large sock.  Sit down and place your heel all the way to the back of the boot.  Wrap the soft liner around your foot and leg.  Place the front piece over the liner.  Start to fasten the straps closest to your toes then move up your leg.  Tighten the straps so they are snug but not too tight. The boot should limit movement but not cut off your blood flow.  If your boot has one or more air chambers, pump them up as directed by your healthcare provider.  Stand up and take a few steps to practice walking.    What else do I need to know?  Check your foot and toes often. Check your foot and toes for redness and swelling. If your toes are red, swollen, numb, or tingly, loosen your straps or deflate the air chamber. Over time, the swelling from the injury or surgery will decrease. When this happens, you may need to tighten the straps.  Be careful when you walk on wet surfaces. The boot may be slippery.  Follow the instructions to wash the liner. Remove the liner and wash it by hand in cold water with a mild detergent. Do not use a washing machine or dryer. Place the liner flat to dry. Wash the plastic parts with a damp cloth and mild soap.  Ask about removing the boot to bathe or for motion exercises. You may need to leave the boot on when you bathe. Cover it with a plastic bag and tape the bag closed around your leg.

## 2023-01-05 NOTE — PROGRESS NOTES
FOOT AND ANKLE SURGERY/PODIATRY CONSULT NOTE         ASSESSMENT:   Achilles tendinitis tendinitis left lower extremity      TREATMENT:  The patient was placed in the cam boot.  He is to ambulate in a cam boot at all times.  He was started on methylprednisolone.  I have also referred the patient to radiology for an MRI of the left Achilles tendon.  The patient is to return to the clinic in 1 week to discuss the findings of the MRI.  I informed the patient that if there are no structural changes to the tendon I would recommend he ambulate in the cam boot for least 8 weeks.        HPI: I was asked to see Obdulio MULLINS Lolly today to evaluate and treat a painful left Achilles tendon.  The patient stated that he injured his Achilles tendon a probably 5 years ago.  Since that time he has had some pain and swelling along the course of the tendon.  The pain is an aching type pain which is aggravated with weightbearing and ambulation.  The pain does improve with rest.  He has not had any associated redness with the symptoms.  He denies any other previous treatment.  Symptoms the patient was seen in consultation at the request of Hardik Jones MD for evaluation and treatment of painful Achilles tendon left lower extremity..     Past Medical History:   Diagnosis Date     Asthma      Hyperlipidemia      Hypertension        Social History     Socioeconomic History     Marital status:      Spouse name: Not on file     Number of children: Not on file     Years of education: Not on file     Highest education level: Not on file   Occupational History     Not on file   Tobacco Use     Smoking status: Never     Smokeless tobacco: Never   Substance and Sexual Activity     Alcohol use: Yes     Alcohol/week: 5.0 - 6.0 standard drinks     Drug use: No     Sexual activity: Not on file   Other Topics Concern     Not on file   Social History Narrative     Not on file     Social Determinants of Health     Financial Resource Strain: Not on  file   Food Insecurity: Not on file   Transportation Needs: Not on file   Physical Activity: Not on file   Stress: Not on file   Social Connections: Not on file   Intimate Partner Violence: Not on file   Housing Stability: Not on file          Allergies   Allergen Reactions     Grass Unknown     Pollen [Pollen Extract] Unknown          Current Outpatient Medications:      albuterol (PROAIR HFA/PROVENTIL HFA/VENTOLIN HFA) 108 (90 Base) MCG/ACT inhaler, Inhale 2 puffs every 4 (four) hours as needed for wheezing or shortness of breath. Disp 3 inhalers., Disp: 18 g, Rfl: 2     atorvastatin (LIPITOR) 40 MG tablet, Take 1 tablet (40 mg) by mouth daily, Disp: 90 tablet, Rfl: 3     clotrimazole-betamethasone (LOTRISONE) cream, [CLOTRIMAZOLE-BETAMETHASONE (LOTRISONE) CREAM] apply topically sparingly to affected areas twice daily as needed, Disp: 45 g, Rfl: 2     fluticasone propionate (FLONASE) 50 mcg/actuation nasal spray, [FLUTICASONE PROPIONATE (FLONASE) 50 MCG/ACTUATION NASAL SPRAY] Apply 2 sprays into each nostril daily., Disp: 48 g, Rfl: 3     indomethacin (INDOCIN) 50 MG capsule, [INDOMETHACIN (INDOCIN) 50 MG CAPSULE] 50 mg three times per day x 3-5 days as needed for gout flare, Disp: 60 capsule, Rfl: 3     lisinopril (ZESTRIL) 10 MG tablet, Take 1 tablet (10 mg) by mouth daily, Disp: 90 tablet, Rfl: 3     olopatadine (PATANOL) 0.1 % ophthalmic solution, INSTILL 1 DROP IN BOTH EYES TWICE A DAY, Disp: 15 mL, Rfl: 3     tamsulosin (FLOMAX) 0.4 MG capsule, Take 1 capsule (0.4 mg) by mouth daily, Disp: 90 capsule, Rfl: 3     triamcinolone (KENALOG) 0.1 % external cream, Apply topically 2 times daily, Disp: 45 g, Rfl: 1     Family History   Problem Relation Age of Onset     Leukemia Mother      Coronary Artery Disease Father         Social History     Socioeconomic History     Marital status:      Spouse name: Not on file     Number of children: Not on file     Years of education: Not on file     Highest education  level: Not on file   Occupational History     Not on file   Tobacco Use     Smoking status: Never     Smokeless tobacco: Never   Substance and Sexual Activity     Alcohol use: Yes     Alcohol/week: 5.0 - 6.0 standard drinks     Drug use: No     Sexual activity: Not on file   Other Topics Concern     Not on file   Social History Narrative     Not on file     Social Determinants of Health     Financial Resource Strain: Not on file   Food Insecurity: Not on file   Transportation Needs: Not on file   Physical Activity: Not on file   Stress: Not on file   Social Connections: Not on file   Intimate Partner Violence: Not on file   Housing Stability: Not on file        Review of Systems - Patient denies fever, chills, rash, wound, stiffness, limping, numbness, weakness, heart burn, blood in stool, chest pain with activity, calf pain when walking, shortness of breath with activity, chronic cough, easy bleeding/bruising, swelling of ankles, excessive thirst, fatigue, depression, anxiety.  Patient admits to painful Achilles tendon left.      OBJECTIVE:  Appearance: alert, well appearing, and in no distress.    There were no vitals taken for this visit.     There is no height or weight on file to calculate BMI.     General appearance: Patient is alert and fully cooperative with history & exam.  No sign of distress is noted during the visit.  Psychiatric: Affect is pleasant & appropriate.  Patient appears motivated to improve health.  Respiratory: Breathing is regular & unlabored while sitting.  HEENT: Hearing is intact to spoken word.  Speech is clear.  No gross evidence of visual impairment that would impact ambulation.    Vascular: Dorsalis pedis and posterior tibial pulses are palpable. There is pedal hair growth bilaterally.  CFT < 3 sec from anterior tibial surface to distal digits bilaterally. There is no appreciable edema noted.  Dermatologic: Turgor and texture are within normal limits. No coloration or temperature  changes. No primary or secondary lesions noted.  Neurologic: All epicritic and proprioceptive sensations are grossly intact bilaterally.  Musculoskeletal: All active and passive ankle, subtalar, midtarsal, and 1st MPJ range of motion are grossly intact without pain or crepitus, with the exception of none. Manual muscle strength is within normal limits bilaterally. All dorsiflexors, plantarflexors, invertors, evertors are intact bilaterally. Tenderness present to left Achilles tendon on palpation.  Mild tenderness to left Achilles tendon with range of motion.  Moderate edema noted left Achilles tendon calf is soft/non-tender ithout warmth/induration    Imaging:       No images are attached to the encounter or orders placed in the encounter.     No results found.   No results found.       Kyler Lay DPM  [facility]

## 2023-01-09 ENCOUNTER — HOSPITAL ENCOUNTER (OUTPATIENT)
Dept: MRI IMAGING | Facility: HOSPITAL | Age: 63
Discharge: HOME | End: 2023-01-09
Attending: PODIATRIST | Admitting: PODIATRIST
Payer: COMMERCIAL

## 2023-01-09 DIAGNOSIS — M76.62 LEFT ACHILLES TENDINITIS: ICD-10-CM

## 2023-01-09 PROCEDURE — 73721 MRI JNT OF LWR EXTRE W/O DYE: CPT | Mod: LT

## 2023-02-22 ENCOUNTER — MYC MEDICAL ADVICE (OUTPATIENT)
Dept: PODIATRY | Facility: CLINIC | Age: 63
End: 2023-02-22
Payer: COMMERCIAL

## 2023-04-03 ENCOUNTER — TRANSFERRED RECORDS (OUTPATIENT)
Dept: HEALTH INFORMATION MANAGEMENT | Facility: CLINIC | Age: 63
End: 2023-04-03
Payer: COMMERCIAL

## 2023-11-20 ENCOUNTER — TRANSFERRED RECORDS (OUTPATIENT)
Dept: HEALTH INFORMATION MANAGEMENT | Facility: CLINIC | Age: 63
End: 2023-11-20
Payer: COMMERCIAL

## 2023-11-27 ENCOUNTER — PATIENT OUTREACH (OUTPATIENT)
Dept: CARE COORDINATION | Facility: CLINIC | Age: 63
End: 2023-11-27
Payer: COMMERCIAL

## 2024-01-22 ENCOUNTER — MYC REFILL (OUTPATIENT)
Dept: FAMILY MEDICINE | Facility: CLINIC | Age: 64
End: 2024-01-22
Payer: COMMERCIAL

## 2024-01-22 DIAGNOSIS — J30.9 AR (ALLERGIC RHINITIS): ICD-10-CM

## 2024-01-22 DIAGNOSIS — H10.13 ALLERGIC CONJUNCTIVITIS OF BOTH EYES: ICD-10-CM

## 2024-01-22 DIAGNOSIS — J45.31 MILD PERSISTENT ASTHMA WITH ACUTE EXACERBATION: ICD-10-CM

## 2024-01-22 DIAGNOSIS — J30.9 ALLERGIC RHINITIS, UNSPECIFIED SEASONALITY, UNSPECIFIED TRIGGER: Primary | ICD-10-CM

## 2024-01-22 DIAGNOSIS — I10 ESSENTIAL HYPERTENSION: ICD-10-CM

## 2024-01-22 DIAGNOSIS — E78.5 HYPERLIPIDEMIA, UNSPECIFIED HYPERLIPIDEMIA TYPE: ICD-10-CM

## 2024-01-22 RX ORDER — OLOPATADINE HYDROCHLORIDE 1 MG/ML
SOLUTION/ DROPS OPHTHALMIC
Qty: 15 ML | Refills: 3 | Status: SHIPPED | OUTPATIENT
Start: 2024-01-22

## 2024-01-22 RX ORDER — FLUTICASONE PROPIONATE 50 MCG
2 SPRAY, SUSPENSION (ML) NASAL DAILY
Qty: 16 G | Refills: 11 | Status: SHIPPED | OUTPATIENT
Start: 2024-01-22

## 2024-01-22 RX ORDER — ALBUTEROL SULFATE 90 UG/1
AEROSOL, METERED RESPIRATORY (INHALATION)
Qty: 18 G | Refills: 2 | Status: SHIPPED | OUTPATIENT
Start: 2024-01-22

## 2024-01-22 RX ORDER — LISINOPRIL 10 MG/1
10 TABLET ORAL DAILY
Qty: 90 TABLET | Refills: 3 | Status: SHIPPED | OUTPATIENT
Start: 2024-01-22

## 2024-01-22 RX ORDER — ATORVASTATIN CALCIUM 40 MG/1
40 TABLET, FILM COATED ORAL DAILY
Qty: 90 TABLET | Refills: 3 | Status: SHIPPED | OUTPATIENT
Start: 2024-01-22

## 2024-02-14 ENCOUNTER — TRANSFERRED RECORDS (OUTPATIENT)
Dept: HEALTH INFORMATION MANAGEMENT | Facility: CLINIC | Age: 64
End: 2024-02-14
Payer: COMMERCIAL

## 2024-02-15 SDOH — HEALTH STABILITY: PHYSICAL HEALTH: ON AVERAGE, HOW MANY MINUTES DO YOU ENGAGE IN EXERCISE AT THIS LEVEL?: 30 MIN

## 2024-02-15 SDOH — HEALTH STABILITY: PHYSICAL HEALTH: ON AVERAGE, HOW MANY DAYS PER WEEK DO YOU ENGAGE IN MODERATE TO STRENUOUS EXERCISE (LIKE A BRISK WALK)?: 6 DAYS

## 2024-02-15 ASSESSMENT — ASTHMA QUESTIONNAIRES
QUESTION_2 LAST FOUR WEEKS HOW OFTEN HAVE YOU HAD SHORTNESS OF BREATH: THREE TO SIX TIMES A WEEK
ACT_TOTALSCORE: 20
QUESTION_5 LAST FOUR WEEKS HOW WOULD YOU RATE YOUR ASTHMA CONTROL: COMPLETELY CONTROLLED
ACT_TOTALSCORE: 20
QUESTION_4 LAST FOUR WEEKS HOW OFTEN HAVE YOU USED YOUR RESCUE INHALER OR NEBULIZER MEDICATION (SUCH AS ALBUTEROL): ONE OR TWO TIMES PER DAY
QUESTION_3 LAST FOUR WEEKS HOW OFTEN DID YOUR ASTHMA SYMPTOMS (WHEEZING, COUGHING, SHORTNESS OF BREATH, CHEST TIGHTNESS OR PAIN) WAKE YOU UP AT NIGHT OR EARLIER THAN USUAL IN THE MORNING: NOT AT ALL
QUESTION_1 LAST FOUR WEEKS HOW MUCH OF THE TIME DID YOUR ASTHMA KEEP YOU FROM GETTING AS MUCH DONE AT WORK, SCHOOL OR AT HOME: NONE OF THE TIME

## 2024-02-15 ASSESSMENT — SOCIAL DETERMINANTS OF HEALTH (SDOH): HOW OFTEN DO YOU GET TOGETHER WITH FRIENDS OR RELATIVES?: MORE THAN THREE TIMES A WEEK

## 2024-02-22 ENCOUNTER — OFFICE VISIT (OUTPATIENT)
Dept: FAMILY MEDICINE | Facility: CLINIC | Age: 64
End: 2024-02-22
Payer: COMMERCIAL

## 2024-02-22 VITALS
HEIGHT: 70 IN | HEART RATE: 60 BPM | SYSTOLIC BLOOD PRESSURE: 118 MMHG | WEIGHT: 217.8 LBS | OXYGEN SATURATION: 99 % | BODY MASS INDEX: 31.18 KG/M2 | TEMPERATURE: 97.1 F | RESPIRATION RATE: 21 BRPM | DIASTOLIC BLOOD PRESSURE: 68 MMHG

## 2024-02-22 DIAGNOSIS — M76.62 LEFT ACHILLES TENDINITIS: ICD-10-CM

## 2024-02-22 DIAGNOSIS — I10 ESSENTIAL HYPERTENSION: ICD-10-CM

## 2024-02-22 DIAGNOSIS — Z12.5 SCREENING FOR PROSTATE CANCER: ICD-10-CM

## 2024-02-22 DIAGNOSIS — N40.0 BPH WITHOUT URINARY OBSTRUCTION: ICD-10-CM

## 2024-02-22 DIAGNOSIS — E66.811 CLASS 1 OBESITY WITH SERIOUS COMORBIDITY AND BODY MASS INDEX (BMI) OF 31.0 TO 31.9 IN ADULT, UNSPECIFIED OBESITY TYPE: ICD-10-CM

## 2024-02-22 DIAGNOSIS — Z23 ENCOUNTER FOR IMMUNIZATION: ICD-10-CM

## 2024-02-22 DIAGNOSIS — J30.9 ALLERGIC RHINITIS, UNSPECIFIED SEASONALITY, UNSPECIFIED TRIGGER: ICD-10-CM

## 2024-02-22 DIAGNOSIS — E78.5 HYPERLIPIDEMIA, UNSPECIFIED HYPERLIPIDEMIA TYPE: ICD-10-CM

## 2024-02-22 DIAGNOSIS — J45.31 MILD PERSISTENT ASTHMA WITH ACUTE EXACERBATION: ICD-10-CM

## 2024-02-22 DIAGNOSIS — R73.01 IMPAIRED FASTING GLUCOSE: ICD-10-CM

## 2024-02-22 DIAGNOSIS — L71.9 ACNE ROSACEA: ICD-10-CM

## 2024-02-22 DIAGNOSIS — Z00.00 ROUTINE PHYSICAL EXAMINATION: Primary | ICD-10-CM

## 2024-02-22 DIAGNOSIS — M1A.9XX1 CHRONIC GOUT WITH TOPHUS, UNSPECIFIED CAUSE, UNSPECIFIED SITE: ICD-10-CM

## 2024-02-22 DIAGNOSIS — D12.6 TUBULAR ADENOMA OF COLON: ICD-10-CM

## 2024-02-22 DIAGNOSIS — I25.10 CORONARY ARTERY DISEASE INVOLVING NATIVE CORONARY ARTERY OF NATIVE HEART WITHOUT ANGINA PECTORIS: ICD-10-CM

## 2024-02-22 LAB
ALBUMIN SERPL BCG-MCNC: 5 G/DL (ref 3.5–5.2)
ALP SERPL-CCNC: 82 U/L (ref 40–150)
ALT SERPL W P-5'-P-CCNC: 38 U/L (ref 0–70)
ANION GAP SERPL CALCULATED.3IONS-SCNC: 12 MMOL/L (ref 7–15)
AST SERPL W P-5'-P-CCNC: 29 U/L (ref 0–45)
BILIRUB SERPL-MCNC: 0.7 MG/DL
BUN SERPL-MCNC: 13.4 MG/DL (ref 8–23)
CALCIUM SERPL-MCNC: 10.1 MG/DL (ref 8.8–10.2)
CHLORIDE SERPL-SCNC: 102 MMOL/L (ref 98–107)
CHOLEST SERPL-MCNC: 153 MG/DL
CREAT SERPL-MCNC: 0.94 MG/DL (ref 0.67–1.17)
DEPRECATED HCO3 PLAS-SCNC: 25 MMOL/L (ref 22–29)
EGFRCR SERPLBLD CKD-EPI 2021: >90 ML/MIN/1.73M2
ERYTHROCYTE [DISTWIDTH] IN BLOOD BY AUTOMATED COUNT: 12.8 % (ref 10–15)
FASTING STATUS PATIENT QL REPORTED: NORMAL
GLUCOSE SERPL-MCNC: 98 MG/DL (ref 70–99)
HBA1C MFR BLD: 5.4 % (ref 0–5.6)
HCT VFR BLD AUTO: 38.5 % (ref 40–53)
HDLC SERPL-MCNC: 59 MG/DL
HGB BLD-MCNC: 13.7 G/DL (ref 13.3–17.7)
LDLC SERPL CALC-MCNC: 79 MG/DL
MCH RBC QN AUTO: 30.3 PG (ref 26.5–33)
MCHC RBC AUTO-ENTMCNC: 35.6 G/DL (ref 31.5–36.5)
MCV RBC AUTO: 85 FL (ref 78–100)
NONHDLC SERPL-MCNC: 94 MG/DL
PLATELET # BLD AUTO: 207 10E3/UL (ref 150–450)
POTASSIUM SERPL-SCNC: 4.4 MMOL/L (ref 3.4–5.3)
PROT SERPL-MCNC: 7.8 G/DL (ref 6.4–8.3)
PSA SERPL DL<=0.01 NG/ML-MCNC: 0.87 NG/ML (ref 0–4.5)
RBC # BLD AUTO: 4.52 10E6/UL (ref 4.4–5.9)
SODIUM SERPL-SCNC: 139 MMOL/L (ref 135–145)
TRIGL SERPL-MCNC: 75 MG/DL
URATE SERPL-MCNC: 5.8 MG/DL (ref 3.4–7)
WBC # BLD AUTO: 5.1 10E3/UL (ref 4–11)

## 2024-02-22 PROCEDURE — 90471 IMMUNIZATION ADMIN: CPT | Performed by: FAMILY MEDICINE

## 2024-02-22 PROCEDURE — 90678 RSV VACC PREF BIVALENT IM: CPT | Performed by: FAMILY MEDICINE

## 2024-02-22 PROCEDURE — G0103 PSA SCREENING: HCPCS | Performed by: FAMILY MEDICINE

## 2024-02-22 PROCEDURE — 80061 LIPID PANEL: CPT | Performed by: FAMILY MEDICINE

## 2024-02-22 PROCEDURE — 36415 COLL VENOUS BLD VENIPUNCTURE: CPT | Performed by: FAMILY MEDICINE

## 2024-02-22 PROCEDURE — 90480 ADMN SARSCOV2 VAC 1/ONLY CMP: CPT | Performed by: FAMILY MEDICINE

## 2024-02-22 PROCEDURE — 80053 COMPREHEN METABOLIC PANEL: CPT | Performed by: FAMILY MEDICINE

## 2024-02-22 PROCEDURE — 83036 HEMOGLOBIN GLYCOSYLATED A1C: CPT | Performed by: FAMILY MEDICINE

## 2024-02-22 PROCEDURE — 91320 SARSCV2 VAC 30MCG TRS-SUC IM: CPT | Performed by: FAMILY MEDICINE

## 2024-02-22 PROCEDURE — 85027 COMPLETE CBC AUTOMATED: CPT | Performed by: FAMILY MEDICINE

## 2024-02-22 PROCEDURE — 84550 ASSAY OF BLOOD/URIC ACID: CPT | Performed by: FAMILY MEDICINE

## 2024-02-22 PROCEDURE — 99396 PREV VISIT EST AGE 40-64: CPT | Mod: 25 | Performed by: FAMILY MEDICINE

## 2024-02-22 PROCEDURE — 99213 OFFICE O/P EST LOW 20 MIN: CPT | Mod: 25 | Performed by: FAMILY MEDICINE

## 2024-02-22 RX ORDER — KETOCONAZOLE 20 MG/ML
SHAMPOO TOPICAL
COMMUNITY
Start: 2024-01-11

## 2024-02-22 RX ORDER — DOXYCYCLINE 100 MG/1
100 CAPSULE ORAL 2 TIMES DAILY
COMMUNITY
Start: 2024-02-07

## 2024-02-22 ASSESSMENT — PAIN SCALES - GENERAL: PAINLEVEL: NO PAIN (0)

## 2024-02-22 NOTE — PROGRESS NOTES
Assessment/Plan:     Routine physical examination  Routine healthcare maintenance.  Preventative cares reviewed.  Annual physical exams to continue.    Class 1 obesity with serious comorbidity and body mass index (BMI) of 31.0 to 31.9 in adult, unspecified obesity type  Dietary and exercise modifications reviewed for weight goal less than 210 pounds initially, less than 200 pounds ideally.    Essential hypertension  Hypertension appears well-controlled continuing lisinopril 10 mg daily.  Med monitoring completed.  - Comprehensive metabolic panel    Hyperlipidemia, unspecified hyperlipidemia type  Continues atorvastatin 40 mg daily for high intensity statin therapy with prior history of CAD noted.  - Lipid panel reflex to direct LDL Fasting  - Comprehensive metabolic panel    Impaired fasting glucose  Impaired fasting glucose historically.  Update fasting glucose and A1c today.  - Comprehensive metabolic panel  - Hemoglobin A1c    Mild persistent asthma with acute exacerbation  Described history of mild persistent asthma however no longer using controller inhaler and states Symbicort was of no benefit.  Albuterol MDI on as-needed basis infrequently.    Chronic gout with tophus, unspecified cause, unspecified site  Chronic gout historically.  Update uric acid level and CBC.  Denies recent exacerbation.  - Comprehensive metabolic panel  - Uric acid  - CBC with platelets    Allergic rhinitis, unspecified seasonality, unspecified trigger  Fluticasone nasal spray utilized.    BPH without urinary obstruction  Tamsulosin 0.4 mg daily continuing.  1-2 times per night nocturia.  Digital rectal exam with mild prostate enlargement, symmetric without induration or nodularity.    Screening for prostate cancer  PSA for prostate cancer screening based on age criteria.  - Prostate Specific Antigen Screen    Encounter for immunization  RSV vaccination and updated COVID booster provided today.  Had flu shot last fall.   Immunizations otherwise appear up-to-date.  - RSV VACCINE (ABRYSVO)  - COVID-19 12+ (2023-24) (PFIZER)    Coronary artery disease involving native coronary artery of native heart without angina pectoris  CAD historically.  40 to 60% stenosis involving left main coronary artery noted.  CT coronary calcium score score of 168 placing patient in the 75th percentile.  Patient had seen cardiologist in 2020 and did recommend follow-up with 2-year follow-up stress testing.  - Adult Cardiology Eval  Referral    Left Achilles tendinitis  Left Achilles tendinitis, chronic.  Avoidance of exacerbating triggers.    Acne rosacea  Using doxycycline per dermatologist with described benefits.    Tubular adenoma of colon  Colonoscopy April 3, 2023 and told to repeat at 5-year interval.       The following high BMI interventions were performed this visit: encouragement to exercise, weight monitoring, weight loss from baseline weight, and lifestyle education regarding diet.  Ensure ongoing efforts to achieve weight goal < 210 pounds initially, < 200 pounds ideally.           Subjective:     Obdulio Ambrocio is a 63 year old male who presents for an annual exam.  In general doing well.  No chest pain.  Did have history of CT coronary angiogram showing 40 to 60% stenosis left main coronary artery with elevated coronary calcium score of 168 placing him in the 75th percentile at that time.  Subsequently seen by cardiologist Dr. Nilesh Reynoso the November 9, 2020 with recommendation for 1 year follow-up as well as consideration for repeat stress testing at 2-year interval.  Patient continuing atorvastatin 40 mg daily for cholesterol management.  Lisinopril 10 mg daily for hypertension.  Tamsulosin 0.4 mg daily for BPH.  Tried Symbicort in the past for mild persistent asthma however states did not help and uses albuterol MDI on as-needed basis.  Impaired fasting glucose.  No recent gout flares.  Snores but does not have apneic episodes  "and no daytime somnolence.  Has not had sleep study.  Allergic rhinitis better with fluticasone nasal spray.  Acne rosacea improved with doxycycline use.  Tubular adenoma on colonoscopy April 3, 2023 and told to repeat at 5-year interval.  Comprehensive review of systems as above otherwise all negative.       \"Daphne\"   2 daughters -   Construction management - escalator work, prior elevator work   No smoke   Occ EtOH   Mom -  around age 76 or 77 with h/o bleeding d/o and \"bleeding out\"   Dad -  87 due to \" in his sleep\"; HTN; bladder cancer   2 bros - bleeding d/o in one of them   4 sis - bleeding d/o with two of them   No surgeries/hospitalizations   Tdap 08 - cholesterol 211, HDL39   Peak Flow Max = 650       Healthy Habits:   HPI     Immunization History   Administered Date(s) Administered    COVID-19 Bivalent 18+ (Moderna) 2022    COVID-19 Monovalent 18+ (Moderna) 2021, 2021    COVID-19 Monovalent Booster 18+ (Moderna) 2021    Flu, Unspecified 10/01/2016, 10/20/2017, 10/31/2019    HepA, Unspecified 2002, 2005    Influenza Vaccine 18-64 (Flublok) 10/18/2023    Influenza Vaccine >6 months,quad, PF 2013, 2022    Influenza Vaccine, 6+MO IM (QUADRIVALENT W/PRESERVATIVES) 2009    Pneumococcal 20 valent Conjugate (Prevnar 20) 2022    Pneumococcal 23 valent 2017    TD,PF 7+ (Tenivac) 2018    TDAP (Adacel,Boostrix) 2008    Td,adult,historic,unspecified 06/10/1998    Zoster recombinant adjuvanted (SHINGRIX) 2019, 2020     Immunization status:  elects COVID booster and RSV vaccine    Current Outpatient Medications   Medication Sig Dispense Refill    albuterol (PROAIR HFA/PROVENTIL HFA/VENTOLIN HFA) 108 (90 Base) MCG/ACT inhaler Inhale 2 puffs every 4 (four) hours as needed for wheezing or shortness of breath. Disp 3 inhalers. 18 g 2    atorvastatin (LIPITOR) 40 MG tablet Take 1 tablet (40 mg) " by mouth daily 90 tablet 3    clotrimazole-betamethasone (LOTRISONE) cream [CLOTRIMAZOLE-BETAMETHASONE (LOTRISONE) CREAM] apply topically sparingly to affected areas twice daily as needed 45 g 2    doxycycline hyclate (VIBRAMYCIN) 100 MG capsule Take 100 mg by mouth 2 times daily      fluticasone (ALLERGY RELIEF) 50 MCG/ACT nasal spray Spray 2 sprays into both nostrils daily 16 g 11    fluticasone propionate (FLONASE) 50 mcg/actuation nasal spray [FLUTICASONE PROPIONATE (FLONASE) 50 MCG/ACTUATION NASAL SPRAY] Apply 2 sprays into each nostril daily. 48 g 3    indomethacin (INDOCIN) 50 MG capsule [INDOMETHACIN (INDOCIN) 50 MG CAPSULE] 50 mg three times per day x 3-5 days as needed for gout flare 60 capsule 3    ketoconazole (NIZORAL) 2 % external shampoo APPLY TO RED, SCALY AREAS ON SCALP, FACE, EARS 2-3 TIMES/WEEK. LEAVE ON 5 MIN PRIOR TO RINSING.      lisinopril (ZESTRIL) 10 MG tablet Take 1 tablet (10 mg) by mouth daily 90 tablet 3    methylPREDNISolone (MEDROL DOSEPAK) 4 MG tablet therapy pack Follow Package Directions 21 tablet 0    olopatadine (PATANOL) 0.1 % ophthalmic solution INSTILL 1 DROP IN BOTH EYES TWICE A DAY 15 mL 3    tamsulosin (FLOMAX) 0.4 MG capsule Take 1 capsule (0.4 mg) by mouth daily 90 capsule 3    triamcinolone (KENALOG) 0.1 % external cream Apply topically 2 times daily 45 g 1     Past Medical History:   Diagnosis Date    Asthma     Hyperlipidemia     Hypertension      Past Surgical History:   Procedure Laterality Date    TONSILLECTOMY       Grass and Pollen [pollen extract]  Family History   Problem Relation Age of Onset    Leukemia Mother     Coronary Artery Disease Father      Social History     Socioeconomic History    Marital status:      Spouse name: Not on file    Number of children: Not on file    Years of education: Not on file    Highest education level: Not on file   Occupational History    Not on file   Tobacco Use    Smoking status: Never    Smokeless tobacco: Never    Substance and Sexual Activity    Alcohol use: Yes     Alcohol/week: 5.0 - 6.0 standard drinks of alcohol    Drug use: No    Sexual activity: Not on file   Other Topics Concern    Not on file   Social History Narrative    Not on file     Social Determinants of Health     Financial Resource Strain: Low Risk  (2/15/2024)    Financial Resource Strain     Within the past 12 months, have you or your family members you live with been unable to get utilities (heat, electricity) when it was really needed?: No   Food Insecurity: Low Risk  (2/15/2024)    Food Insecurity     Within the past 12 months, did you worry that your food would run out before you got money to buy more?: No     Within the past 12 months, did the food you bought just not last and you didn t have money to get more?: No   Transportation Needs: Low Risk  (2/15/2024)    Transportation Needs     Within the past 12 months, has lack of transportation kept you from medical appointments, getting your medicines, non-medical meetings or appointments, work, or from getting things that you need?: No   Physical Activity: Sufficiently Active (2/15/2024)    Exercise Vital Sign     Days of Exercise per Week: 6 days     Minutes of Exercise per Session: 30 min   Stress: No Stress Concern Present (2/15/2024)    [place] [employer] - Occupational Stress Questionnaire     Feeling of Stress : Only a little   Social Connections: Unknown (2/15/2024)    Social Connection and Isolation Panel [NHANES]     Frequency of Communication with Friends and Family: Not on file     Frequency of Social Gatherings with Friends and Family: More than three times a week     Attends Rastafari Services: Not on file     Active Member of Clubs or Organizations: Not on file     Attends Club or Organization Meetings: Not on file     Marital Status: Not on file   Interpersonal Safety: Low Risk  (2/22/2024)    Interpersonal Safety     Do you feel physically and emotionally safe  "where you currently live?: Yes     Within the past 12 months, have you been hit, slapped, kicked or otherwise physically hurt by someone?: No     Within the past 12 months, have you been humiliated or emotionally abused in other ways by your partner or ex-partner?: No   Housing Stability: Low Risk  (2/15/2024)    Housing Stability     Do you have housing? : Yes     Are you worried about losing your housing?: No       Review of Systems  Comprehensive ROS: as above, otherwise all negative.           Objective:     /68 (BP Location: Left arm, Patient Position: Sitting, Cuff Size: Adult Large)   Pulse 60   Temp 97.1  F (36.2  C) (Temporal)   Resp 21   Ht 1.785 m (5' 10.28\")   Wt 98.8 kg (217 lb 12.8 oz)   SpO2 99%   BMI 31.01 kg/m    Body mass index is 31.01 kg/m .    Physical    General Appearance:    Alert, cooperative, no distress, appears stated age.     Head:    Normocephalic, without obvious abnormality, atraumatic   Eyes:    PERRL, conjunctiva/corneas clear, EOM's intact, fundi     benign, both eyes        Ears:    Normal TM's and external ear canals, both ears   Nose:   Nares normal, septum midline, mucosa normal, no drainage    or sinus tenderness   Throat:   Lips, mucosa, and tongue normal; teeth and gums normal   Neck:   Supple, symmetrical, trachea midline, no adenopathy;        thyroid:  No enlargement/tenderness/nodules; no carotid    bruit or JVD   Back:     Symmetric, no curvature, ROM normal, no CVA tenderness   Lungs:     Clear to auscultation bilaterally, respirations unlabored   Chest wall:    No tenderness or deformity   Heart:    Regular rate and rhythm, S1 and S2 normal, no murmur, rub   or gallop   Abdomen:     Soft, non-tender, bowel sounds active all four quadrants,     no masses, no organomegaly.     Genitalia:    Normal male without lesion, discharge or tenderness.  No inguinal hernia noted.     Rectal:    Normal tone.  Prostate normal/symmetric, no masses or tenderness. "   Extremities:   Extremities normal, atraumatic, no cyanosis or edema   Pulses:   2+ and symmetric all extremities   Skin:   Skin color, texture, turgor normal, no rashes or lesions   Lymph nodes:   Cervical, supraclavicular, and axillary nodes normal   Neurologic:   CNII-XII intact. Normal strength, sensation and reflexes       throughout         Left ankle MRI January 9, 2023  IMPRESSION:  1.  Moderate Achilles tendinopathy without tendon tear.  2.  Remainder unremarkable.      Reviewed cardiology note from November 9, 2020  Assessment:    Coronary artery disease with 40-60% left main stenosis by CT coronary angiogram - stable without angina  Hyperlipidemia - treated with atorvastatin  Hypertension - well-controlled.  Mild obesity - BMI 31     Plan:  Increase atorvastatin to 40 mg daily  Advised regular exercise  Follow up in 1 year  Plan for another stress test in 2 years (2022).         CTA Angiogram coronary  Study Result    Narrative & Impression     The total Agatston calcium score is 168. A calcium score in this range places the individual in the 75th percentile when compared to an age and gender matched control group and implies a high risk of cardiac events in the next ten years.    Plaque within the left main coronary artery with an estimate luminal stenosis that appears mild to moderate in severity. Estimate luminal stenosis of 40 to 60%. Not obviously flow-limiting.    Mild disease in the ostial portion of the circumflex with an estimate luminal stenosis of 25 to 49%.    Mild disease in the left anterior descending with an estimate luminal stenosis of 25 to 49% in its proximal portion    Given the findings in the left main consideration should be given to additional testing possibly stress echocardiogram or stress nuclear examination to further evaluate for ischemia.    Please see separate report per radiology for additional findings.         Echo 8/19/19  Narrative & Impression    Left ventricle  ejection fraction is normal at baseline. The estimated left ventricular ejection fraction is 60%. No significant valve disease.    The patient's exercise tolerance was normal. No chest pain reported.    The stress electrocardiogram is negative for inducible ischemic EKG changes.    Stress echocardiogram is negative for inducible ischemia.    No previous study for comparison.      This note has been dictated using voice recognition software and as a result may contain minor grammatical errors and unintended word substitutions.

## 2024-02-23 ENCOUNTER — PATIENT OUTREACH (OUTPATIENT)
Dept: GASTROENTEROLOGY | Facility: CLINIC | Age: 64
End: 2024-02-23
Payer: COMMERCIAL

## 2024-03-22 ENCOUNTER — MYC MEDICAL ADVICE (OUTPATIENT)
Dept: FAMILY MEDICINE | Facility: CLINIC | Age: 64
End: 2024-03-22
Payer: COMMERCIAL

## 2024-03-22 DIAGNOSIS — N40.0 BPH WITHOUT URINARY OBSTRUCTION: ICD-10-CM

## 2024-03-22 RX ORDER — TAMSULOSIN HYDROCHLORIDE 0.4 MG/1
CAPSULE ORAL
Qty: 180 CAPSULE | Refills: 3 | Status: SHIPPED | OUTPATIENT
Start: 2024-03-22

## 2024-03-22 NOTE — TELEPHONE ENCOUNTER
"Per OV notes from 2/22/24:  \"BPH without urinary obstruction  Tamsulosin 0.4 mg daily continuing.  1-2 times per night nocturia.  Digital rectal exam with mild prostate enlargement, symmetric without induration or nodularity.\"    Rx pended; please review sig and send in if appropriate.    YUNIER ArmednarizN, RN  [facility]    "

## 2024-04-23 ENCOUNTER — E-VISIT (OUTPATIENT)
Dept: FAMILY MEDICINE | Facility: CLINIC | Age: 64
End: 2024-04-23
Payer: COMMERCIAL

## 2024-04-23 DIAGNOSIS — L30.9 DERMATITIS: ICD-10-CM

## 2024-04-23 PROCEDURE — 99421 OL DIG E/M SVC 5-10 MIN: CPT | Performed by: FAMILY MEDICINE

## 2024-04-23 RX ORDER — TRIAMCINOLONE ACETONIDE 1 MG/G
CREAM TOPICAL 2 TIMES DAILY
Qty: 45 G | Refills: 1 | Status: SHIPPED | OUTPATIENT
Start: 2024-04-23

## 2024-05-03 ENCOUNTER — OFFICE VISIT (OUTPATIENT)
Dept: INTERNAL MEDICINE | Facility: CLINIC | Age: 64
End: 2024-05-03
Payer: COMMERCIAL

## 2024-05-03 VITALS
BODY MASS INDEX: 32.64 KG/M2 | TEMPERATURE: 97.5 F | DIASTOLIC BLOOD PRESSURE: 82 MMHG | RESPIRATION RATE: 16 BRPM | SYSTOLIC BLOOD PRESSURE: 128 MMHG | OXYGEN SATURATION: 96 % | WEIGHT: 228 LBS | HEIGHT: 70 IN | HEART RATE: 73 BPM

## 2024-05-03 DIAGNOSIS — L01.00 IMPETIGO: Primary | ICD-10-CM

## 2024-05-03 PROCEDURE — 99203 OFFICE O/P NEW LOW 30 MIN: CPT | Performed by: INTERNAL MEDICINE

## 2024-05-03 RX ORDER — MUPIROCIN 20 MG/G
OINTMENT TOPICAL 3 TIMES DAILY
Qty: 30 G | Refills: 1 | Status: SHIPPED | OUTPATIENT
Start: 2024-05-03

## 2024-05-03 NOTE — PROGRESS NOTES
"  Assessment & Plan     Impetigo  62 yo male with h/o rosacea, seeing Dermatology consultants, had  a Covid vaccine on 2/22/24 when had a physical.  He developed redness and swelling of the right arm after the vaccine, and fw days later papular rash below the right armpit. Few weeks later, similar rash on the right posterior upper back, around the nose and on the left ear lobe. Some of the areas are crusted, draining yellow fluid, no bleeding. He tried Aquafor, benadryl gel, triamcinolone. Nothing worked.  He is taking doxycyline 50 mg daily for rosacea, but not using prescribed azelaic acid, metronidazole, ketoconazole.    The rash on his back, around the nose and on the ear looks like impetigo.  For MRSA coverage, doxycyclines will work but he needs to take 100 mg bid.  I sent the rx for mupirocin ointment.  He has follow-up scheduled with his dermatologist for next week.  - mupirocin (BACTROBAN) 2 % external ointment; Apply topically 3 times daily            BMI  Estimated body mass index is 32.45 kg/m  as calculated from the following:    Height as of this encounter: 1.785 m (5' 10.28\").    Weight as of this encounter: 103.4 kg (228 lb).             Neymar Brown is a 63 year old, presenting for the following health issues:  Rash (Itchy Red Blotchy  Rash On Body x Feb. 14th )      5/3/2024     3:42 PM   Additional Questions   Roomed by Thi     Rash  Associated symptoms include a rash.   History of Present Illness       Reason for visit:  Rash that is incontrlable  Symptom onset:  More than a month  Symptoms include:  Rash and drainage  Symptom intensity:  Severe  Symptom progression:  Worsening  Had these symptoms before:  No  What makes it worse:  Not arcelia a ointment  What makes it better:  Being awake. Sleeping with this is difficult    He eats 0-1 servings of fruits and vegetables daily.He consumes 1 sweetened beverage(s) daily.He exercises with enough effort to increase his heart rate 9 or less " "minutes per day.  He exercises with enough effort to increase his heart rate 7 days per week.   He is taking medications regularly.                     Objective    /82   Pulse 73   Temp 97.5  F (36.4  C)   Resp 16   Ht 1.785 m (5' 10.28\")   Wt 103.4 kg (228 lb)   SpO2 96%   BMI 32.45 kg/m    Body mass index is 32.45 kg/m .  Physical Exam               Signed Electronically by: Jayson Pastrana MD    "

## 2024-08-29 ENCOUNTER — NURSE TRIAGE (OUTPATIENT)
Dept: FAMILY MEDICINE | Facility: CLINIC | Age: 64
End: 2024-08-29

## 2024-08-29 DIAGNOSIS — M10.9 ACUTE GOUT INVOLVING TOE, UNSPECIFIED CAUSE, UNSPECIFIED LATERALITY: Primary | ICD-10-CM

## 2024-08-29 RX ORDER — INDOMETHACIN 50 MG/1
50 CAPSULE ORAL
Qty: 60 CAPSULE | Refills: 3 | Status: CANCELLED | OUTPATIENT
Start: 2024-08-29 | End: 2024-09-18

## 2024-08-29 RX ORDER — PREDNISONE 20 MG/1
20 TABLET ORAL 2 TIMES DAILY
Qty: 10 TABLET | Refills: 0 | Status: SHIPPED | OUTPATIENT
Start: 2024-08-29 | End: 2024-09-03

## 2024-08-29 NOTE — TELEPHONE ENCOUNTER
Nurse Triage SBAR    Is this a 2nd Level Triage? YES, LICENSED PRACTITIONER REVIEW IS REQUIRED    Situation: Patient having a gout flare up.      Background: Last seen for this in 2021.    Note from that visit:  Acute gout, unspecified cause, unspecified site  Indomethacin provided in place of colchicine per patient request stating indomethacin 50 mg 3 times daily on as-needed basis works better.  Refill provided.  - indomethacin (INDOCIN) 50 MG capsule; 50 mg three times per day x 3-5 days as needed for gout flare  Dispense: 60 capsule; Refill: 3       Assessment: Patient states that feels just like did previously.  Tried soaking it but is very painful. Rates pain 7/10 and states is still able to walk on it, but difficult.    Protocol Recommended Disposition:   See in Office Within 3 Days    Recommendation: Patient would rather have rx sent in rather than be seen at this time.  Please advise.    RANDAL Martinez RN  [facility]       Routed to provider    Does the patient meet one of the following criteria for ADS visit consideration? 16+ years old, with an [facility] PCP     TIP  Providers, please consider if this condition is appropriate for management at one of our Acute and Diagnostic Services sites.     If patient is a good candidate, please use dotphrase <dot>triageresponse and select Refer to ADS to document.    Reason for Disposition   Pain in the big toe joint    Additional Information   Negative: Followed an ankle or foot injury   Negative: Toe pain is main symptom   Negative: Ankle pain is main symptom   Negative: Entire foot is cool or blue in comparison to other foot   Negative: Purple or black skin on foot or toe   Negative: Red area or streak and fever   Negative: Swollen foot and fever   Negative: Patient sounds very sick or weak to the triager   Negative: SEVERE pain (e.g., excruciating, unable to do any normal activities)   Negative: Looks like a boil, infected sore, deep  "ulcer, or other infected rash (spreading redness, pus)   Negative: Swollen foot  (Exceptions: Localized bump from bunions, calluses, insect bite, sting.)   Negative: Weakness (i.e., loss of strength) of new-onset in foot or toes (Exceptions: Not truly weak, foot feels weak because of pain; weakness present > 2 weeks.)   Negative: Numbness (i.e., loss of sensation) in foot or toes (Exception: Just tingling; numbness present > 2 weeks.)   Negative: MODERATE pain (e.g., interferes with normal activities, limping) and present > 3 days   Negative: Weakness or numbness in foot or toes and present > 2 weeks   Negative: Tingling in feet and new or increased    Answer Assessment - Initial Assessment Questions  1. ONSET: \"When did the pain start?\"       Started yesterday  2. LOCATION: \"Where is the pain located?\"       Right great toe  3. PAIN: \"How bad is the pain?\"    (Scale 1-10; or mild, moderate, severe)   - MILD (1-3): doesn't interfere with normal activities.    - MODERATE (4-7): interferes with normal activities (e.g., work or school) or awakens from sleep, limping.    - SEVERE (8-10): excruciating pain, unable to do any normal activities, unable to walk.       Rates pain 7/10 - can still bear weight but difficult  4. WORK OR EXERCISE: \"Has there been any recent work or exercise that involved this part of the body?\"       no  5. CAUSE: \"What do you think is causing the foot pain?\"      Gout  6. OTHER SYMPTOMS: \"Do you have any other symptoms?\" (e.g., leg pain, rash, fever, numbness)      No, slight redness in toe  7. PREGNANCY: \"Is there any chance you are pregnant?\" \"When was your last menstrual period?\"      N/a    Protocols used: Foot Pain-A-[place]    "

## 2024-08-30 NOTE — TELEPHONE ENCOUNTER
Please notify patient that I sent a prescription for prednisone 20 mg to be used twice daily x 5 days to his local pharmacy for gout flare.  Please ensure patient follow-up if persistent or recurrent concerns.

## 2024-08-30 NOTE — TELEPHONE ENCOUNTER
Provider Recommendation Follow Up:   Reached patient/caregiver. Informed of provider's recommendations. Patient verbalized understanding and agrees with the plan.           RANDAL Muñiz, RN  [facility]

## 2024-12-28 DIAGNOSIS — E78.5 HYPERLIPIDEMIA, UNSPECIFIED HYPERLIPIDEMIA TYPE: ICD-10-CM

## 2024-12-28 DIAGNOSIS — I10 ESSENTIAL HYPERTENSION: ICD-10-CM

## 2024-12-30 RX ORDER — LISINOPRIL 10 MG/1
10 TABLET ORAL DAILY
Qty: 90 TABLET | Refills: 0 | Status: SHIPPED | OUTPATIENT
Start: 2024-12-30

## 2024-12-30 RX ORDER — ATORVASTATIN CALCIUM 40 MG/1
40 TABLET, FILM COATED ORAL DAILY
Qty: 90 TABLET | Refills: 0 | Status: SHIPPED | OUTPATIENT
Start: 2024-12-30

## 2025-01-23 ENCOUNTER — PATIENT OUTREACH (OUTPATIENT)
Dept: CARE COORDINATION | Facility: CLINIC | Age: 65
End: 2025-01-23
Payer: COMMERCIAL

## 2025-02-06 ENCOUNTER — PATIENT OUTREACH (OUTPATIENT)
Dept: CARE COORDINATION | Facility: CLINIC | Age: 65
End: 2025-02-06
Payer: COMMERCIAL

## 2025-04-06 ENCOUNTER — HEALTH MAINTENANCE LETTER (OUTPATIENT)
Age: 65
End: 2025-04-06

## 2025-07-01 ENCOUNTER — HOSPITAL ENCOUNTER (EMERGENCY)
Facility: CLINIC | Age: 65
Discharge: HOME | End: 2025-07-01
Admitting: EMERGENCY MEDICINE
Payer: COMMERCIAL

## 2025-07-01 ENCOUNTER — APPOINTMENT (OUTPATIENT)
Dept: RADIOLOGY | Facility: CLINIC | Age: 65
End: 2025-07-01
Attending: PHYSICIAN ASSISTANT
Payer: COMMERCIAL

## 2025-07-01 VITALS
WEIGHT: 225 LBS | HEIGHT: 72 IN | BODY MASS INDEX: 30.48 KG/M2 | RESPIRATION RATE: 18 BRPM | OXYGEN SATURATION: 92 % | DIASTOLIC BLOOD PRESSURE: 80 MMHG | TEMPERATURE: 96.8 F | HEART RATE: 54 BPM | SYSTOLIC BLOOD PRESSURE: 148 MMHG

## 2025-07-01 DIAGNOSIS — R55 SYNCOPE: ICD-10-CM

## 2025-07-01 LAB
ALBUMIN SERPL BCG-MCNC: 4.8 G/DL (ref 3.5–5.2)
ALP SERPL-CCNC: 79 U/L (ref 40–150)
ALT SERPL W P-5'-P-CCNC: 33 U/L (ref 0–70)
ANION GAP SERPL CALCULATED.3IONS-SCNC: 13 MMOL/L (ref 7–15)
AST SERPL W P-5'-P-CCNC: 32 U/L (ref 0–45)
BILIRUB SERPL-MCNC: 0.6 MG/DL
BUN SERPL-MCNC: 12.2 MG/DL (ref 8–23)
CALCIUM SERPL-MCNC: 9.6 MG/DL (ref 8.8–10.4)
CHLORIDE SERPL-SCNC: 101 MMOL/L (ref 98–107)
CREAT SERPL-MCNC: 0.95 MG/DL (ref 0.67–1.17)
D DIMER PPP FEU-MCNC: <0.27 UG/ML FEU (ref 0–0.5)
EGFRCR SERPLBLD CKD-EPI 2021: 89 ML/MIN/1.73M2
ERYTHROCYTE [DISTWIDTH] IN BLOOD BY AUTOMATED COUNT: 12.6 % (ref 10–15)
GLUCOSE BLDC GLUCOMTR-MCNC: 142 MG/DL (ref 70–99)
GLUCOSE SERPL-MCNC: 130 MG/DL (ref 70–99)
HCO3 SERPL-SCNC: 23 MMOL/L (ref 22–29)
HCT VFR BLD AUTO: 37.6 % (ref 40–53)
HGB BLD-MCNC: 13.4 G/DL (ref 13.3–17.7)
HOLD SPECIMEN: NORMAL
MAGNESIUM SERPL-MCNC: 1.9 MG/DL (ref 1.7–2.3)
MCH RBC QN AUTO: 31.2 PG (ref 26.5–33)
MCHC RBC AUTO-ENTMCNC: 35.6 G/DL (ref 31.5–36.5)
MCV RBC AUTO: 87 FL (ref 78–100)
PLATELET # BLD AUTO: 217 10E3/UL (ref 150–450)
POTASSIUM SERPL-SCNC: 4 MMOL/L (ref 3.4–5.3)
PROT SERPL-MCNC: 7.2 G/DL (ref 6.4–8.3)
RBC # BLD AUTO: 4.3 10E6/UL (ref 4.4–5.9)
SODIUM SERPL-SCNC: 137 MMOL/L (ref 135–145)
TROPONIN T SERPL HS-MCNC: <6 NG/L
TSH SERPL DL<=0.005 MIU/L-ACNC: 1.87 UIU/ML (ref 0.3–4.2)
WBC # BLD AUTO: 5 10E3/UL (ref 4–11)

## 2025-07-01 PROCEDURE — 36415 COLL VENOUS BLD VENIPUNCTURE: CPT | Performed by: EMERGENCY MEDICINE

## 2025-07-01 PROCEDURE — 85014 HEMATOCRIT: CPT | Performed by: PHYSICIAN ASSISTANT

## 2025-07-01 PROCEDURE — 85379 FIBRIN DEGRADATION QUANT: CPT | Performed by: PHYSICIAN ASSISTANT

## 2025-07-01 PROCEDURE — 93005 ELECTROCARDIOGRAM TRACING: CPT | Performed by: EMERGENCY MEDICINE

## 2025-07-01 PROCEDURE — 83735 ASSAY OF MAGNESIUM: CPT | Performed by: PHYSICIAN ASSISTANT

## 2025-07-01 PROCEDURE — 84484 ASSAY OF TROPONIN QUANT: CPT | Performed by: PHYSICIAN ASSISTANT

## 2025-07-01 PROCEDURE — 84443 ASSAY THYROID STIM HORMONE: CPT | Performed by: PHYSICIAN ASSISTANT

## 2025-07-01 PROCEDURE — 82962 GLUCOSE BLOOD TEST: CPT

## 2025-07-01 PROCEDURE — 71046 X-RAY EXAM CHEST 2 VIEWS: CPT

## 2025-07-01 PROCEDURE — 82947 ASSAY GLUCOSE BLOOD QUANT: CPT | Performed by: PHYSICIAN ASSISTANT

## 2025-07-01 PROCEDURE — 99285 EMERGENCY DEPT VISIT HI MDM: CPT | Mod: 25

## 2025-07-01 ASSESSMENT — ACTIVITIES OF DAILY LIVING (ADL)
ADLS_ACUITY_SCORE: 41

## 2025-07-01 ASSESSMENT — COLUMBIA-SUICIDE SEVERITY RATING SCALE - C-SSRS
6. HAVE YOU EVER DONE ANYTHING, STARTED TO DO ANYTHING, OR PREPARED TO DO ANYTHING TO END YOUR LIFE?: NO
1. IN THE PAST MONTH, HAVE YOU WISHED YOU WERE DEAD OR WISHED YOU COULD GO TO SLEEP AND NOT WAKE UP?: NO
2. HAVE YOU ACTUALLY HAD ANY THOUGHTS OF KILLING YOURSELF IN THE PAST MONTH?: NO

## 2025-07-01 NOTE — ED PROVIDER NOTES
Emergency Department Encounter   NAME: Obdulio Ambrocio ; AGE: 65 year old male ; YOB: 1960 ; MRN: [id number] ; PCP: Hardik Mg   ED PROVIDER: Brielle Gallo PA-C    Evaluation Date & Time:   No admission date for patient encounter.    CHIEF COMPLAINT:  Syncope      Impression and Plan   MDM: Obdulio Ambrocio is a 65 year old male who presents to the ED for evaluation of syncope.  The patient presents to the emergency department for evaluation after a witnessed syncopal episode by his wife.  He was on the phone 30 minutes prior to arrival, when he states that he forgot the number of the building that he typically would remember, and lost consciousness per wife and slumped in the chair.  He was out for about 5 to 7 seconds or coming to.  Was confused for 10 minutes with repetitive questioning before clearing.  Patient reportedly had felt lightheaded in the waiting room, however is asymptomatic at this time.  He is afebrile and vitally stable.  Moderately hypertensive to 169/63 with a known history of hypertension.  He is clinically well-appearing and in no acute distress.  Exam is unrevealing.  Considered a broad differential including dehydration, orthostatic hypotension, metabolic derangement, TIA/CVA, cardiac arrhythmia, ACS, PE.  We discussed plan for cardiac monitoring, EKG, laboratory workup and chest x-ray to further evaluate.    No preceding symptoms, and he is neuro intact - no concern for CVA and unlikely presentation of TIA. No seizure history or activity, mildly confused after syncope though conversive which resolved within 10 minutes. Lower suspicion for partial complex seizure. Nursing staff immediately checked blood sugar upon arrival which returned mildly elevated at 142.  No leukocytosis, fever, or infectious symptoms.  Hemoglobin within normal limits.  No metabolic derangements.  Orthostatic vitals within normal limits.  TSH within normal limits.  Considered PE, however no risk  factors or symptoms to suggest this.  Unable to use PERC criteria given age and as he is low risk, will obtain D-dimer which returned within normal limits.  No further workup for PE is indicated.  Chest x-ray obtained -no evidence of cardiomegaly, widened mediastinum, or pulmonary congestion.  EKG showed normal sinus rhythm with left axis deviation and right bundle branch block.  Similar to previous EKG from 1/23/2017.  Initial troponin returned at less than 6.  No concern at this time for ACS.  Patient monitored in the ER for several hours with no return of symptoms or evidence of arrhythmia on EKG.  Description of syncope could certainly be concerning for cardiac arrhythmia which I discussed with patient and wife at length.  We discussed observation admission for further cardiac monitoring/syncope workup though they declined. Patient would like to return home with outpatient follow up.  We discussed risks of untreated cardiac arrhythmia including sudden cardiac arrest.  Patient is competent to make his own medical decisions.  Strict return precautions provided, referral to rapid access clinic placed as well as referral for holter monitor. We discussed driving restriction until cleared by outpatient providers. We reviewed concerning signs and symptoms to return to the ED and he verbalized understanding.      MIPS (CTPE, Dental pain, Breaux, Sinusitis, Asthma/COPD, Head Trauma): Not Applicable    SEPSIS: None       ED COURSE:  4:28 PM I met and introduced myself to the patient. I gathered initial history and performed my physical exam. We discussed plan for initial workup.   6:00 PM I discussed the case with Dr. Esteban, ED MD.   7:29 PM We discussed admission vs discharge. Patient would like to go home.    At the conclusion of the encounter I discussed the results of all the tests and the disposition. The questions were answered. The patient or family acknowledged understanding and was agreeable with the care  plan.    FINAL IMPRESSION:    ICD-10-CM    1. Syncope  R55 Holter Monitor 24 hour Adult Pediatric     Rapid Access Clinic  Referral            MEDICATIONS GIVEN IN THE EMERGENCY DEPARTMENT:  Medications - No data to display        NEW PRESCRIPTIONS STARTED AT TODAY'S ED VISIT:  Discharge Medication List as of 7/1/2025  7:34 PM            HPI   Use of Intrepreter: N/A     Obdulio Ambrocio is a 65 year old male with a pertinent history of asthma, hyperlipidemia, and HTN who presents to the ED by walk in for evaluation of syncope.     Patient reports he was talking on the phone with a coworker at 3:30 PM when he experienced a sudden episode of syncope. Patient reports he came to after a few seconds and hung up the phone. The phone call was typical for work- not stressful. Endorses diaphoresis, and lightheadedness afterwards. Patient's wife reports he was very confused for around 10 minutes after episode and repeated the same questions over and over but speech was clear. Patient was able to ambulate. States he feels normal at time of exam. No seizure activity. No problems exercising recently. No long plane or car rides. No recent procedures.     Patient denies chest pain, shortness of breath, fever, cough, vomiting, diarrhea, and fatigue.       REVIEW OF SYSTEMS:  Pertinent positive and negative symptoms per HPI.       Medical History     Past Medical History:   Diagnosis Date    Asthma     Hyperlipidemia     Hypertension        Past Surgical History:   Procedure Laterality Date    TONSILLECTOMY         Family History   Problem Relation Age of Onset    Leukemia Mother     Coronary Artery Disease Father        Social History     Tobacco Use    Smoking status: Never     Passive exposure: Never    Smokeless tobacco: Never   Vaping Use    Vaping status: Never Used   Substance Use Topics    Alcohol use: Yes     Alcohol/week: 5.0 - 6.0 standard drinks of alcohol    Drug use: No       albuterol (PROAIR HFA/PROVENTIL  HFA/VENTOLIN HFA) 108 (90 Base) MCG/ACT inhaler  atorvastatin (LIPITOR) 40 MG tablet  clotrimazole-betamethasone (LOTRISONE) cream  doxycycline hyclate (VIBRAMYCIN) 100 MG capsule  fluticasone (ALLERGY RELIEF) 50 MCG/ACT nasal spray  indomethacin (INDOCIN) 50 MG capsule  ketoconazole (NIZORAL) 2 % external shampoo  lisinopril (ZESTRIL) 10 MG tablet  mupirocin (BACTROBAN) 2 % external ointment  olopatadine (PATANOL) 0.1 % ophthalmic solution  tamsulosin (FLOMAX) 0.4 MG capsule  triamcinolone (KENALOG) 0.1 % external cream          Physical Exam     First Vitals:  Patient Vitals for the past 24 hrs:   BP Temp Temp src Pulse Resp SpO2 Height Weight   07/01/25 1938 (!) 148/80 -- -- 54 18 92 % -- --   07/01/25 1754 -- -- -- -- -- 98 % -- --   07/01/25 1753 -- -- -- 58 17 -- -- --   07/01/25 1643 -- -- -- 67 19 98 % -- --   07/01/25 1610 (!) 163/88 -- -- 62 -- 97 % -- --   07/01/25 1602 (!) 169/63 96.8  F (36  C) Temporal 56 16 96 % 1.829 m (6') 102.1 kg (225 lb)         PHYSICAL EXAM:   Physical Exam  Vitals reviewed.   Constitutional:       General: He is not in acute distress.     Appearance: He is not ill-appearing, toxic-appearing or diaphoretic.   HENT:      Head: Normocephalic and atraumatic.      Mouth/Throat:      Mouth: Mucous membranes are moist.      Pharynx: Oropharynx is clear.   Eyes:      Extraocular Movements: Extraocular movements intact.      Conjunctiva/sclera: Conjunctivae normal.      Pupils: Pupils are equal, round, and reactive to light.   Cardiovascular:      Rate and Rhythm: Normal rate and regular rhythm.      Pulses: Normal pulses.      Heart sounds: Normal heart sounds. No murmur heard.     No friction rub. No gallop.   Pulmonary:      Effort: Pulmonary effort is normal.      Breath sounds: Normal breath sounds.   Abdominal:      General: Abdomen is flat. There is no distension.      Palpations: Abdomen is soft.      Tenderness: There is no abdominal tenderness. There is no guarding or  rebound.   Musculoskeletal:      Cervical back: Normal range of motion and neck supple.      Right lower leg: No edema.      Left lower leg: No edema.   Skin:     General: Skin is warm and dry.   Neurological:      Mental Status: He is alert and oriented to person, place, and time. Mental status is at baseline.      Cranial Nerves: No facial asymmetry.      Comments: CN 3-12 intact.  Answering questions appropriately with normal speech.  Following commands.  Negative pronator drift.  Finger/nose/finger intact.  5 out of 5 strength with , flexion extension at elbow and knee joint, flexion at shoulder and hip strength, dorsiflexion and plantarflexion against resistance.  Sensation to light touch intact to face and all extremities.         Results     LAB:  All pertinent labs reviewed and interpreted  Labs Ordered and Resulted from Time of ED Arrival to Time of ED Departure   GLUCOSE BY METER - Abnormal       Result Value    GLUCOSE BY METER POCT 142 (*)    CBC WITH PLATELETS - Abnormal    WBC Count 5.0      RBC Count 4.30 (*)     Hemoglobin 13.4      Hematocrit 37.6 (*)     MCV 87      MCH 31.2      MCHC 35.6      RDW 12.6      Platelet Count 217     COMPREHENSIVE METABOLIC PANEL - Abnormal    Sodium 137      Potassium 4.0      Carbon Dioxide (CO2) 23      Anion Gap 13      Urea Nitrogen 12.2      Creatinine 0.95      GFR Estimate 89      Calcium 9.6      Chloride 101      Glucose 130 (*)     Alkaline Phosphatase 79      AST 32      ALT 33      Protein Total 7.2      Albumin 4.8      Bilirubin Total 0.6     MAGNESIUM - Normal    Magnesium 1.9     TSH WITH FREE T4 REFLEX - Normal    TSH 1.87     TROPONIN T, HIGH SENSITIVITY - Normal    Troponin T, High Sensitivity <6     D DIMER QUANTITATIVE - Normal    D-Dimer Quantitative <0.27     GLUCOSE MONITOR NURSING POCT       RADIOLOGY:  Chest XR,  PA & LAT   Final Result   IMPRESSION: Negative chest.      Holter Monitor 24 hour Adult Pediatric    (Results Pending)          ECG:  Performed at: 16:02 7/1/2025    Impression: Sinus rhythm, Left axis deviation, Incomplete right bundle branch block, Possible Anterior infarct,  age undetermined, Abnormal ECG   When compared with ECG of 23-Jan-2017 15:12, No significant change was found     Rate: 63 BPM  Rhythm: Sinus  Axis: - 44  CA Interval: 192 ms  QRS Interval: 92 ms  QTc Interval: 425 ms  ST Changes: No significant change  Comparison: 15:12 1/23/2017    EKG results reviewed and interpreted by Dr. Carlito ED MD.     I, Lorena Richter, am serving as a scribe to document services personally performed by Brielle Gallo PA-C, based on my observation and the provider's statements to me. I, Brielle Gallo PA-C attest that Lorena Richter is acting in a scribe capacity, has observed my performance of the services and has documented them in accordance with my direction.       Brielle Gallo PA-C   Emergency Medicine   [facility] EMERGENCY ROOM       Brielle Gallo PA-C  07/02/25 0008

## 2025-07-01 NOTE — ED TRIAGE NOTES
Patient states at 1530 he was talking on the phone at the kitchen table felt dizzy and diaphoretic, words were slurred and he passed out, his head went forward and he was about to hit his head on the plate when he snapped out of it and awoke, wife witnessed and states he was confused for a few seconds, patient states he is asymptomatic, but while getting an ECG he states he feels lightheaded again.

## 2025-07-02 ENCOUNTER — PATIENT OUTREACH (OUTPATIENT)
Dept: FAMILY MEDICINE | Facility: CLINIC | Age: 65
End: 2025-07-02
Payer: COMMERCIAL

## 2025-07-02 LAB
ATRIAL RATE - MUSE: 63 BPM
DIASTOLIC BLOOD PRESSURE - MUSE: NORMAL MMHG
INTERPRETATION ECG - MUSE: NORMAL
P AXIS - MUSE: 18 DEGREES
PR INTERVAL - MUSE: 192 MS
QRS DURATION - MUSE: 92 MS
QT - MUSE: 416 MS
QTC - MUSE: 425 MS
R AXIS - MUSE: -44 DEGREES
SYSTOLIC BLOOD PRESSURE - MUSE: NORMAL MMHG
T AXIS - MUSE: 22 DEGREES
VENTRICULAR RATE- MUSE: 63 BPM

## 2025-07-02 NOTE — DISCHARGE INSTRUCTIONS
As we discussed, I have placed a rapid referral to cardiology and they should be contacting you to schedule follow-up in 48 hours.  Have also ordered an outpatient Holter monitor and they should be contacting you to set this up.    If at anytime you have a very similar episode, chest pain, shortness of breath, headache, visual or speech changes, arm or leg weakness or numbness, difficulty walking, or seizure-like activity please return to the ER for further evaluation.    As we discussed, please do not drive until we can identify cause of this episode.    Your blood pressure was elevated in the emergencydepartment today and requires recheck and close follow-up in your primary care clinic. Untreated blood pressure can cause serious complications including, but not limited to stroke, heart attack/failure, and kidney disease.  Please make a close follow-up appointment to have this recheck performed. Please return to the emergency department immediately if you develop a severe headache, vision changes, chest pain, shortness of breath, orabdominal pain.

## 2025-07-02 NOTE — TELEPHONE ENCOUNTER
Transitions of Care Outreach  Chief Complaint   Patient presents with    Hospital F/U       Most Recent Admission Date: 7/1/2025   Most Recent Admission Diagnosis:      Most Recent Discharge Date: 7/1/2025   Most Recent Discharge Diagnosis: Syncope - R55     Transitions of Care Assessment    Discharge Assessment  How are you doing now that you are home?: feeling a little better today  How are your symptoms? (Red Flag symptoms escalate to triage hotline per guidelines): Improved  Do you know how to contact your clinic care team if you have future questions or changes to your health status? : Yes  Does the patient have their discharge instructions? : Yes  Does the patient have questions regarding their discharge instructions? : No  Were you started on any new medications or were there changes to any of your previous medications? : No  Does the patient have all of their medications?: Yes  Do you have questions regarding any of your medications? : No  Do you have all of your needed medical supplies or equipment (DME)?  (i.e. oxygen tank, CPAP, cane, etc.): Yes    Follow up Plan     Discharge Follow-Up  Discharge follow up appointment scheduled in alignment with recommended follow up timeframe or Transitions of Risk Category? (Low = within 30 days; Moderate= within 14 days; High= within 7 days): No  Patient's follow up appointment not scheduled: Patient declined scheduling support. Education on the importance of transitions of care follow up. Provided scheduling phone number.    Future Appointments   Date Time Provider Department Center   7/16/2025  7:50 AM Phoebe Ruff MD [facility]   8/7/2025  3:30 PM Hardik Mg MD [id number]       Outpatient Plan as outlined on AVS reviewed with patient.    For any urgent concerns, please contact our 24 hour nurse triage line: [phone] ([phone])       Angel Sandhu RN

## 2025-07-16 ENCOUNTER — TELEPHONE (OUTPATIENT)
Dept: CARDIOLOGY | Facility: CLINIC | Age: 65
End: 2025-07-16

## 2025-07-16 ENCOUNTER — OFFICE VISIT (OUTPATIENT)
Dept: CARDIOLOGY | Facility: CLINIC | Age: 65
End: 2025-07-16
Attending: PHYSICIAN ASSISTANT
Payer: COMMERCIAL

## 2025-07-16 VITALS
SYSTOLIC BLOOD PRESSURE: 120 MMHG | RESPIRATION RATE: 16 BRPM | WEIGHT: 228 LBS | DIASTOLIC BLOOD PRESSURE: 70 MMHG | HEART RATE: 68 BPM | BODY MASS INDEX: 30.92 KG/M2

## 2025-07-16 DIAGNOSIS — E66.811 CLASS 1 OBESITY DUE TO EXCESS CALORIES WITH SERIOUS COMORBIDITY AND BODY MASS INDEX (BMI) OF 30.0 TO 30.9 IN ADULT: Primary | ICD-10-CM

## 2025-07-16 DIAGNOSIS — E66.811 CLASS 1 OBESITY DUE TO EXCESS CALORIES WITH SERIOUS COMORBIDITY AND BODY MASS INDEX (BMI) OF 30.0 TO 30.9 IN ADULT: ICD-10-CM

## 2025-07-16 DIAGNOSIS — E66.09 CLASS 1 OBESITY DUE TO EXCESS CALORIES WITH SERIOUS COMORBIDITY AND BODY MASS INDEX (BMI) OF 30.0 TO 30.9 IN ADULT: ICD-10-CM

## 2025-07-16 DIAGNOSIS — I25.83 CORONARY ARTERIOSCLEROSIS DUE TO LIPID RICH PLAQUE: ICD-10-CM

## 2025-07-16 DIAGNOSIS — N40.0 BENIGN PROSTATIC HYPERPLASIA WITHOUT LOWER URINARY TRACT SYMPTOMS: ICD-10-CM

## 2025-07-16 DIAGNOSIS — E66.3 OVERWEIGHT: ICD-10-CM

## 2025-07-16 DIAGNOSIS — E78.00 PURE HYPERCHOLESTEROLEMIA: ICD-10-CM

## 2025-07-16 DIAGNOSIS — E66.09 CLASS 1 OBESITY DUE TO EXCESS CALORIES WITH SERIOUS COMORBIDITY AND BODY MASS INDEX (BMI) OF 30.0 TO 30.9 IN ADULT: Primary | ICD-10-CM

## 2025-07-16 DIAGNOSIS — I10 ESSENTIAL HYPERTENSION: ICD-10-CM

## 2025-07-16 DIAGNOSIS — R55 SYNCOPE, UNSPECIFIED SYNCOPE TYPE: Primary | ICD-10-CM

## 2025-07-16 PROCEDURE — 3074F SYST BP LT 130 MM HG: CPT | Performed by: INTERNAL MEDICINE

## 2025-07-16 PROCEDURE — 3078F DIAST BP <80 MM HG: CPT | Performed by: INTERNAL MEDICINE

## 2025-07-16 PROCEDURE — G2211 COMPLEX E/M VISIT ADD ON: HCPCS | Performed by: INTERNAL MEDICINE

## 2025-07-16 PROCEDURE — 99204 OFFICE O/P NEW MOD 45 MIN: CPT | Performed by: INTERNAL MEDICINE

## 2025-07-16 NOTE — TELEPHONE ENCOUNTER
----- Message from JALEESA HATCH sent at 7/16/2025  8:19 AM CDT -----  New rapid access clinic patient today, can we evaluate for sleep consult?  LF

## 2025-07-16 NOTE — PROGRESS NOTES
[facility] Heart Care Office Consult     Assessment:   (R55) Syncope, unspecified syncope type  (primary encounter diagnosis)  Comment: Agree sounds cardiac given the brief nature without any aura, postictal state, with seizure activity.  Most likely due to low blood pressure, however cannot rule out arrhythmia or other cardiac source.  Given this we will arrange for echocardiogram as well as 2-week Zio patch monitor.  Agree with driving restrictions for at least a month.  If abnormal rhythm seen will address.    (I25.83) Coronary arteriosclerosis due to lipid rich plaque  Comment: CTA in 2019 showed left main 40 to 60% stenosis, left anterior sending with 25 to 50% proximal, ostial circumflex 25 to 50%, and normal right coronary artery.  Had stress echo which was unremarkable in 2019.  Is still asymptomatic but given syncope will arrange for stress nuclear and if abnormal proceed with angiography.    (I10) Essential hypertension  Comment: Under good control currently on lisinopril.    (E78.00) Pure hypercholesterolemia  Comment: On atorvastatin total cholesterol 153 and LDL 79 which is acceptable.    (E66.811,  E66.09,  Z68.30) Class 1 obesity due to excess calories with serious comorbidity and body mass index (BMI) of 30.0 to 30.9 in adult  Comment: BMI slightly elevated at 30.92 and would recommend evaluation for   (N40.0) Benign prostatic hyperplasia without lower urinary tract symptoms  Comment: Asymptomatic and has as needed Flomax.     Plan:   1.  Echocardiogram for structural heart disease and address if abnormal.  2.  2-week Zio patch monitor and address if abnormal.  3.  Exercise stress nuclear and address if abnormal.  4.  Continue driving restriction for a month.  5.  Would recommend sleep apnea evaluation and weight loss.  6.  Can follow with Dr. Nilesh Fregoso primary cardiologist in a year to 2 years.    History of Present Illness:   Thank you for asking the [facility] Care team to see  Ms. Briscoe is a 24 yo F presenting for new patient visit for chronic cough.  Long h/o post-nasal drip during allergy season. H/o asthma Thought the cough was due to the above.  Allergy meds and decongestants did not help her cough.  Last month her symptoms were worse at night, coughing when lying flat.  Cough is usually dry, occasional white foam or yellow sputum.  When she drinks cold drinks she starts coughing.  Tried OTC omeprazole inconsistently for 4-5 days and it helped somewhat.  She does feel some heartburn or acid reflux every week.  Occasionally feels rare nausea when eating. No vomiting.  No unintentional weight loss.    Patient seen today via telehealth by agreement and consent of patient in light of current COVID-19 pandemic. I used video conferencing during the visit. The patient encounter is appropriate and reasonable under the circumstances given the patient's particular presentation at this time. The patient has been advised of the followin) the potential risks and limitations of this mode of treatment (including but not limited to the absence of in-person examination); 2) the right to refuse telehealth services at any point without affecting the right to future care; 3) the right to receive in-person services, included immediately after this consultation if an urgent need arises; 4) information, including identifiable images or information from this telehealth consult, will only be shared in accordance with HIPAA regulations. Any and all of the patient's and/or patient's family member's questions on this issue have been answered. The patient has verbally consented to be treated via telehealth services. The patient has also been advised to contact this office for worsening conditions or problems, and seek emergency medical treatment and/or call 911 if the patient deems either necessary. More than half of the face-to-face time used for counseling and coordination of care. Visit takes place in patient's home Obdulio MULLINS White a 65 year old male  in consultation  to evaluate syncope.   Patient was in his usual state of health, normal day at work, and sat down to have dinner which was a sandwich.  While sitting down he felt extremely lightheaded and he blacked out.  He slumped over in his chair and was out for about 15 seconds and then came to.  He had significant diaphoresis and was confused for about 10 minutes.  His wife then drove him to the emergency department and en route he felt somewhat lightheaded.  There was no bladder or bowel incontinence nor is any seizure activity nor were there any prior episodes or subsequent episodes.  He was referred to the rapid access clinic for further evaluation.    Past Medical History:     Past Medical History:   Diagnosis Date    Asthma     Hyperlipidemia     Hypertension  Benign prostatic hypertrophy  Nonobstructive coronary artery disease      Past history is negative for cancer, tuberculosis, diabetes mellitus, myocardial infarction,  rheumatic fever, cerebrovascular accident, chronic kidney disease, peptic ulcer disease, chronic obstructive pulmonary disease, or thyroid disorder.    Past Surgical History:     Past Surgical History:   Procedure Laterality Date    TONSILLECTOMY       Family History:   Family history is negative for coronary artery disease.    Social History:   He does independently with his wife, works as a , reports that he has never smoked. He has never been exposed to tobacco smoke. He has never used smokeless tobacco. He reports current alcohol use of about 5.0 - 6.0 standard drinks of alcohol per week. He reports that he does not use drugs.  The primary care physician is Hardik Mg    Meds:   Scheduled Meds:  Current Outpatient Medications   Medication Sig Dispense Refill    albuterol (PROAIR HFA/PROVENTIL HFA/VENTOLIN HFA) 108 (90 Base) MCG/ACT inhaler Inhale 2 puffs every 4 (four) hours as needed for wheezing or shortness of breath. Disp 3  inhalers. 18 g 2    atorvastatin (LIPITOR) 40 MG tablet TAKE 1 TABLET DAILY 90 tablet 0    fluticasone (ALLERGY RELIEF) 50 MCG/ACT nasal spray Spray 2 sprays into both nostrils daily 16 g 11    lisinopril (ZESTRIL) 10 MG tablet TAKE 1 TABLET DAILY 90 tablet 0    olopatadine (PATANOL) 0.1 % ophthalmic solution INSTILL 1 DROP IN BOTH EYES TWICE A DAY 15 mL 3    tamsulosin (FLOMAX) 0.4 MG capsule Take 1 tablet by mouth 1-2 times per night as needed for nocturia 180 capsule 3    triamcinolone (KENALOG) 0.1 % external cream Apply topically 2 times daily 45 g 1    indomethacin (INDOCIN) 50 MG capsule [INDOMETHACIN (INDOCIN) 50 MG CAPSULE] 50 mg three times per day x 3-5 days as needed for gout flare 60 capsule 3    ketoconazole (NIZORAL) 2 % external shampoo APPLY TO RED, SCALY AREAS ON SCALP, FACE, EARS 2-3 TIMES/WEEK. LEAVE ON 5 MIN PRIOR TO RINSING.      mupirocin (BACTROBAN) 2 % external ointment Apply topically 3 times daily 30 g 1       PRN Meds:.  No current facility-administered medications for this visit.       Allergies:   Grass and Pollen [pollen extract]    Objective:      Physical Exam  103.4 kg (228 lb)     Body mass index is 30.92 kg/m .  /70 (BP Location: Right arm, Patient Position: Sitting, Cuff Size: Adult Large)   Pulse 68   Resp 16   Wt 103.4 kg (228 lb)   BMI 30.92 kg/m      General Appearance:   Alert, cooperative and in no acute distress.   HEENT:  No scleral icterus; the mucous membranes were pink and moist.   Neck: JVP normal. No thyromegaly. No HJR   Chest: The spine was straight. The chest was symmetric.   Lungs:   Respirations unlabored; the lungs are clear to auscultation.   Cardiovascular:   S1 and S2 without murmur, clicks or rubs. Brachial, radial, carotid and posterior tibial pulses are intact and symetrical.  No carotid bruits noted   Abdomen:  No organomegaly, masses, bruits, or tenderness. Bowels sounds are present   Extremities: No cyanosis, clubbing, or edema.   Skin: No  "xanthelasma.   Neurologic: Mood and affect are appropriate.         Lab Reviewed Personally by myself  Lab Results   Component Value Date     07/01/2025    CO2 23 07/01/2025    CO2 24 06/14/2021    BUN 12.2 07/01/2025    BUN 13 06/14/2021     Lab Results   Component Value Date    WBC 5.0 07/01/2025    HGB 13.4 07/01/2025    HCT 37.6 07/01/2025    MCV 87 07/01/2025     07/01/2025     Lab Results   Component Value Date    CHOL 153 02/22/2024    TRIG 75 02/22/2024    HDL 59 02/22/2024     No results found for: \"BNP\"    ECG personally reviewed by myself shows sinus rhythm, leftward axis, incomplete right bundle branch block pattern.  No QT prolongation.     Review of Systems:     Review of Systems:   Encounter Vitals  BP: 120/70  Pulse: 68  Resp: 16  Weight: 103.4 kg (228 lb)                                          "

## 2025-07-16 NOTE — LETTER
7/16/2025    Hardik Mg MD  [address]  [facility] [address]    RE: Obdulio HARPREET Ambrocio       Dear Colleague,     I had the pleasure of seeing Obdulio HARPREET Ambrocio in the [facility].    [facility] Office Consult     Assessment:   (R55) Syncope, unspecified syncope type  (primary encounter diagnosis)  Comment: Agree sounds cardiac given the brief nature without any aura, postictal state, with seizure activity.  Most likely due to low blood pressure, however cannot rule out arrhythmia or other cardiac source.  Given this we will arrange for echocardiogram as well as 2-week Zio patch monitor.  Agree with driving restrictions for at least a month.  If abnormal rhythm seen will address.    (I25.83) Coronary arteriosclerosis due to lipid rich plaque  Comment: CTA in 2019 showed left main 40 to 60% stenosis, left anterior sending with 25 to 50% proximal, ostial circumflex 25 to 50%, and normal right coronary artery.  Had stress echo which was unremarkable in 2019.  Is still asymptomatic but given syncope will arrange for stress nuclear and if abnormal proceed with angiography.    (I10) Essential hypertension  Comment: Under good control currently on lisinopril.    (E78.00) Pure hypercholesterolemia  Comment: On atorvastatin total cholesterol 153 and LDL 79 which is acceptable.    (E66.811,  E66.09,  Z68.30) Class 1 obesity due to excess calories with serious comorbidity and body mass index (BMI) of 30.0 to 30.9 in adult  Comment: BMI slightly elevated at 30.92 and would recommend evaluation for   (N40.0) Benign prostatic hyperplasia without lower urinary tract symptoms  Comment: Asymptomatic and has as needed Flomax.     Plan:   1.  Echocardiogram for structural heart disease and address if abnormal.  2.  2-week Zio patch monitor and address if abnormal.  3.  Exercise stress nuclear and address if abnormal.  4.  Continue driving restriction for a month.  5.  Would recommend sleep  apnea evaluation and weight loss.  6.  Can follow with Dr. Nilesh Fregoso primary cardiologist in a year to 2 years.    History of Present Illness:   Thank you for asking the [facility] team to see Obdulio Ambrocio a 65 year old male  in consultation  to evaluate syncope.   Patient was in his usual state of health, normal day at work, and sat down to have dinner which was a sandwich.  While sitting down he felt extremely lightheaded and he blacked out.  He slumped over in his chair and was out for about 15 seconds and then came to.  He had significant diaphoresis and was confused for about 10 minutes.  His wife then drove him to the emergency department and en route he felt somewhat lightheaded.  There was no bladder or bowel incontinence nor is any seizure activity nor were there any prior episodes or subsequent episodes.  He was referred to the rapid access clinic for further evaluation.    Past Medical History:     Past Medical History:   Diagnosis Date     Asthma      Hyperlipidemia      Hypertension  Benign prostatic hypertrophy  Nonobstructive coronary artery disease      Past history is negative for cancer, tuberculosis, diabetes mellitus, myocardial infarction,  rheumatic fever, cerebrovascular accident, chronic kidney disease, peptic ulcer disease, chronic obstructive pulmonary disease, or thyroid disorder.    Past Surgical History:     Past Surgical History:   Procedure Laterality Date     TONSILLECTOMY       Family History:   Family history is negative for coronary artery disease.    Social History:   He does independently with his wife, works as a , reports that he has never smoked. He has never been exposed to tobacco smoke. He has never used smokeless tobacco. He reports current alcohol use of about 5.0 - 6.0 standard drinks of alcohol per week. He reports that he does not use drugs.  The primary care physician is Hardik Mg    Meds:   Scheduled Meds:  Current Outpatient Medications    Medication Sig Dispense Refill     albuterol (PROAIR HFA/PROVENTIL HFA/VENTOLIN HFA) 108 (90 Base) MCG/ACT inhaler Inhale 2 puffs every 4 (four) hours as needed for wheezing or shortness of breath. Disp 3 inhalers. 18 g 2     atorvastatin (LIPITOR) 40 MG tablet TAKE 1 TABLET DAILY 90 tablet 0     fluticasone (ALLERGY RELIEF) 50 MCG/ACT nasal spray Spray 2 sprays into both nostrils daily 16 g 11     lisinopril (ZESTRIL) 10 MG tablet TAKE 1 TABLET DAILY 90 tablet 0     olopatadine (PATANOL) 0.1 % ophthalmic solution INSTILL 1 DROP IN BOTH EYES TWICE A DAY 15 mL 3     tamsulosin (FLOMAX) 0.4 MG capsule Take 1 tablet by mouth 1-2 times per night as needed for nocturia 180 capsule 3     triamcinolone (KENALOG) 0.1 % external cream Apply topically 2 times daily 45 g 1     indomethacin (INDOCIN) 50 MG capsule [INDOMETHACIN (INDOCIN) 50 MG CAPSULE] 50 mg three times per day x 3-5 days as needed for gout flare 60 capsule 3     ketoconazole (NIZORAL) 2 % external shampoo APPLY TO RED, SCALY AREAS ON SCALP, FACE, EARS 2-3 TIMES/WEEK. LEAVE ON 5 MIN PRIOR TO RINSING.       mupirocin (BACTROBAN) 2 % external ointment Apply topically 3 times daily 30 g 1       PRN Meds:.  No current facility-administered medications for this visit.       Allergies:   Grass and Pollen [pollen extract]    Objective:      Physical Exam  103.4 kg (228 lb)     Body mass index is 30.92 kg/m .  /70 (BP Location: Right arm, Patient Position: Sitting, Cuff Size: Adult Large)   Pulse 68   Resp 16   Wt 103.4 kg (228 lb)   BMI 30.92 kg/m      General Appearance:   Alert, cooperative and in no acute distress.   HEENT:  No scleral icterus; the mucous membranes were pink and moist.   Neck: JVP normal. No thyromegaly. No HJR   Chest: The spine was straight. The chest was symmetric.   Lungs:   Respirations unlabored; the lungs are clear to auscultation.   Cardiovascular:   S1 and S2 without murmur, clicks or rubs. Brachial, radial, carotid and  "posterior tibial pulses are intact and symetrical.  No carotid bruits noted   Abdomen:  No organomegaly, masses, bruits, or tenderness. Bowels sounds are present   Extremities: No cyanosis, clubbing, or edema.   Skin: No xanthelasma.   Neurologic: Mood and affect are appropriate.         Lab Reviewed Personally by myself  Lab Results   Component Value Date     07/01/2025    CO2 23 07/01/2025    CO2 24 06/14/2021    BUN 12.2 07/01/2025    BUN 13 06/14/2021     Lab Results   Component Value Date    WBC 5.0 07/01/2025    HGB 13.4 07/01/2025    HCT 37.6 07/01/2025    MCV 87 07/01/2025     07/01/2025     Lab Results   Component Value Date    CHOL 153 02/22/2024    TRIG 75 02/22/2024    HDL 59 02/22/2024     No results found for: \"BNP\"    ECG personally reviewed by myself shows sinus rhythm, leftward axis, incomplete right bundle branch block pattern.  No QT prolongation.     Review of Systems:     Review of Systems:   Encounter Vitals  BP: 120/70  Pulse: 68  Resp: 16  Weight: 103.4 kg (228 lb)                                            Thank you for allowing me to participate in the care of your patient.      Sincerely,     JALEESA HATCH MD     [facility]  cc:   Brielle Gallo PA-C  [address] [place] [address]      "

## 2025-07-16 NOTE — PATIENT INSTRUCTIONS
MR. Mak A White,  It certainly was nice to meet you today.  Per our conversation you had a black episode.  This could be an abnormal heartbeat and the reason I am checking the ultrasound of the heart and the heart monitor. Given the history of the blockages we will also get the stress test with radiation.  We will call you the results of these tests.  In the interim continue the driving restrictions.  If all well would suggest following up with Dr Nilesh Fregoso in a year or so.  Chivo Ruff

## 2025-07-17 ENCOUNTER — PATIENT OUTREACH (OUTPATIENT)
Dept: CARE COORDINATION | Facility: CLINIC | Age: 65
End: 2025-07-17
Payer: COMMERCIAL

## 2025-07-21 ENCOUNTER — PATIENT OUTREACH (OUTPATIENT)
Dept: CARE COORDINATION | Facility: CLINIC | Age: 65
End: 2025-07-21
Payer: COMMERCIAL

## 2025-07-23 ENCOUNTER — HOSPITAL ENCOUNTER (OUTPATIENT)
Dept: NUCLEAR MEDICINE | Facility: HOSPITAL | Age: 65
Discharge: HOME | End: 2025-07-23
Attending: INTERNAL MEDICINE
Payer: COMMERCIAL

## 2025-07-23 ENCOUNTER — HOSPITAL ENCOUNTER (OUTPATIENT)
Dept: CARDIOLOGY | Facility: HOSPITAL | Age: 65
Discharge: HOME | End: 2025-07-23
Attending: INTERNAL MEDICINE
Payer: COMMERCIAL

## 2025-07-23 ENCOUNTER — ORDERS ONLY (AUTO-RELEASED) (OUTPATIENT)
Dept: CARDIOLOGY | Facility: CLINIC | Age: 65
End: 2025-07-23

## 2025-07-23 DIAGNOSIS — R55 SYNCOPE, UNSPECIFIED SYNCOPE TYPE: ICD-10-CM

## 2025-07-23 DIAGNOSIS — I25.83 CORONARY ARTERIOSCLEROSIS DUE TO LIPID RICH PLAQUE: ICD-10-CM

## 2025-07-23 LAB
CV STRESS CURRENT BP HE: NORMAL
CV STRESS CURRENT HR HE: 101
CV STRESS CURRENT HR HE: 102
CV STRESS CURRENT HR HE: 105
CV STRESS CURRENT HR HE: 109
CV STRESS CURRENT HR HE: 112
CV STRESS CURRENT HR HE: 115
CV STRESS CURRENT HR HE: 122
CV STRESS CURRENT HR HE: 122
CV STRESS CURRENT HR HE: 128
CV STRESS CURRENT HR HE: 133
CV STRESS CURRENT HR HE: 133
CV STRESS CURRENT HR HE: 135
CV STRESS CURRENT HR HE: 141
CV STRESS CURRENT HR HE: 141
CV STRESS CURRENT HR HE: 146
CV STRESS CURRENT HR HE: 70
CV STRESS CURRENT HR HE: 73
CV STRESS CURRENT HR HE: 85
CV STRESS CURRENT HR HE: 86
CV STRESS CURRENT HR HE: 87
CV STRESS CURRENT HR HE: 89
CV STRESS CURRENT HR HE: 90
CV STRESS CURRENT HR HE: 94
CV STRESS CURRENT HR HE: 94
CV STRESS CURRENT HR HE: 97
CV STRESS CURRENT HR HE: 98
CV STRESS DEVIATION TIME HE: NORMAL
CV STRESS ECHO PERCENT HR HE: NORMAL
CV STRESS EXERCISE STAGE HE: NORMAL
CV STRESS EXERCISE STAGE REACHED HE: NORMAL
CV STRESS FINAL RESTING BP HE: NORMAL
CV STRESS FINAL RESTING HR HE: 87
CV STRESS MAX HR HE: 146
CV STRESS MAX TREADMILL GRADE HE: 14
CV STRESS MAX TREADMILL SPEED HE: 3.4
CV STRESS PEAK DIA BP HE: NORMAL
CV STRESS PEAK SYS BP HE: NORMAL
CV STRESS PHASE HE: NORMAL
CV STRESS PROTOCOL HE: NORMAL
CV STRESS RESTING PT POSITION HE: NORMAL
CV STRESS RESTING PT POSITION HE: NORMAL
CV STRESS ST DEVIATION AMOUNT HE: NORMAL
CV STRESS ST DEVIATION ELEVATION HE: NORMAL
CV STRESS ST EVELATION AMOUNT HE: NORMAL
CV STRESS TEST TYPE HE: NORMAL
CV STRESS TOTAL STAGE TIME MIN 1 HE: NORMAL
NUC STRESS EJECTION FRACTION: 60 %
RATE PRESSURE PRODUCT: NORMAL
STRESS ECHO BASELINE DIASTOLIC HE: 87
STRESS ECHO BASELINE HR: 70
STRESS ECHO BASELINE SYSTOLIC BP: 161
STRESS ECHO CALCULATED PERCENT HR: 94 %
STRESS ECHO LAST STRESS DIASTOLIC BP: 72
STRESS ECHO LAST STRESS HR: 146
STRESS ECHO LAST STRESS SYSTOLIC BP: 188
STRESS ECHO POST ESTIMATED WORKLOAD: 9.5
STRESS ECHO POST EXERCISE DUR MIN: 7
STRESS ECHO POST EXERCISE DUR SEC: 27
STRESS ECHO TARGET HR: 156

## 2025-07-23 PROCEDURE — A9500 TC99M SESTAMIBI: HCPCS | Performed by: INTERNAL MEDICINE

## 2025-07-23 PROCEDURE — 93018 CV STRESS TEST I&R ONLY: CPT | Performed by: INTERNAL MEDICINE

## 2025-07-23 PROCEDURE — 78452 HT MUSCLE IMAGE SPECT MULT: CPT | Mod: 26 | Performed by: INTERNAL MEDICINE

## 2025-07-23 PROCEDURE — 343N000001 HC RX 343 MED OP 636: Performed by: INTERNAL MEDICINE

## 2025-07-23 PROCEDURE — 78452 HT MUSCLE IMAGE SPECT MULT: CPT

## 2025-07-23 PROCEDURE — 93017 CV STRESS TEST TRACING ONLY: CPT

## 2025-07-23 RX ADMIN — TECHNETIUM TC 99M SESTAMIBI 8.8 MILLICURIE: 1 INJECTION INTRAVENOUS at 13:09

## 2025-07-23 RX ADMIN — TECHNETIUM TC 99M SESTAMIBI 32.9 MILLICURIE: 1 INJECTION INTRAVENOUS at 14:16

## 2025-07-24 ENCOUNTER — RESULTS FOLLOW-UP (OUTPATIENT)
Dept: CARDIOLOGY | Facility: CLINIC | Age: 65
End: 2025-07-24
Payer: COMMERCIAL

## 2025-07-24 DIAGNOSIS — I25.83 CORONARY ARTERIOSCLEROSIS DUE TO LIPID RICH PLAQUE: Primary | ICD-10-CM

## 2025-07-24 DIAGNOSIS — I10 ESSENTIAL HYPERTENSION: ICD-10-CM

## 2025-07-24 DIAGNOSIS — E78.00 PURE HYPERCHOLESTEROLEMIA: ICD-10-CM

## 2025-08-07 ENCOUNTER — OFFICE VISIT (OUTPATIENT)
Dept: FAMILY MEDICINE | Facility: CLINIC | Age: 65
End: 2025-08-07
Payer: COMMERCIAL

## 2025-08-07 VITALS
WEIGHT: 226 LBS | OXYGEN SATURATION: 98 % | HEART RATE: 51 BPM | TEMPERATURE: 97.6 F | DIASTOLIC BLOOD PRESSURE: 77 MMHG | RESPIRATION RATE: 20 BRPM | BODY MASS INDEX: 30.61 KG/M2 | HEIGHT: 72 IN | SYSTOLIC BLOOD PRESSURE: 121 MMHG

## 2025-08-07 DIAGNOSIS — L30.9 DERMATITIS: ICD-10-CM

## 2025-08-07 DIAGNOSIS — Z00.00 ROUTINE PHYSICAL EXAMINATION: Primary | ICD-10-CM

## 2025-08-07 DIAGNOSIS — I10 ESSENTIAL HYPERTENSION: ICD-10-CM

## 2025-08-07 DIAGNOSIS — N40.0 BPH WITHOUT URINARY OBSTRUCTION: ICD-10-CM

## 2025-08-07 DIAGNOSIS — I25.10 CORONARY ARTERY DISEASE INVOLVING NATIVE CORONARY ARTERY OF NATIVE HEART WITHOUT ANGINA PECTORIS: ICD-10-CM

## 2025-08-07 DIAGNOSIS — L71.9 ACNE ROSACEA: ICD-10-CM

## 2025-08-07 DIAGNOSIS — E66.811 CLASS 1 OBESITY WITH SERIOUS COMORBIDITY AND BODY MASS INDEX (BMI) OF 30.0 TO 30.9 IN ADULT, UNSPECIFIED OBESITY TYPE: ICD-10-CM

## 2025-08-07 DIAGNOSIS — Z12.5 SCREENING FOR PROSTATE CANCER: ICD-10-CM

## 2025-08-07 DIAGNOSIS — D12.6 TUBULAR ADENOMA OF COLON: ICD-10-CM

## 2025-08-07 DIAGNOSIS — E78.5 HYPERLIPIDEMIA, UNSPECIFIED HYPERLIPIDEMIA TYPE: ICD-10-CM

## 2025-08-07 DIAGNOSIS — R73.01 IMPAIRED FASTING GLUCOSE: ICD-10-CM

## 2025-08-07 DIAGNOSIS — M1A.9XX1 CHRONIC GOUT WITH TOPHUS, UNSPECIFIED CAUSE, UNSPECIFIED SITE: ICD-10-CM

## 2025-08-07 DIAGNOSIS — R55 SYNCOPE, UNSPECIFIED SYNCOPE TYPE: ICD-10-CM

## 2025-08-07 LAB
EST. AVERAGE GLUCOSE BLD GHB EST-MCNC: 117 MG/DL
HBA1C MFR BLD: 5.7 % (ref 0–5.6)

## 2025-08-07 SDOH — HEALTH STABILITY: PHYSICAL HEALTH: ON AVERAGE, HOW MANY DAYS PER WEEK DO YOU ENGAGE IN MODERATE TO STRENUOUS EXERCISE (LIKE A BRISK WALK)?: 5 DAYS

## 2025-08-07 SDOH — HEALTH STABILITY: PHYSICAL HEALTH: ON AVERAGE, HOW MANY MINUTES DO YOU ENGAGE IN EXERCISE AT THIS LEVEL?: 10 MIN

## 2025-08-07 ASSESSMENT — ASTHMA QUESTIONNAIRES
QUESTION_3 LAST FOUR WEEKS HOW OFTEN DID YOUR ASTHMA SYMPTOMS (WHEEZING, COUGHING, SHORTNESS OF BREATH, CHEST TIGHTNESS OR PAIN) WAKE YOU UP AT NIGHT OR EARLIER THAN USUAL IN THE MORNING: NOT AT ALL
QUESTION_5 LAST FOUR WEEKS HOW WOULD YOU RATE YOUR ASTHMA CONTROL: COMPLETELY CONTROLLED
QUESTION_1 LAST FOUR WEEKS HOW MUCH OF THE TIME DID YOUR ASTHMA KEEP YOU FROM GETTING AS MUCH DONE AT WORK, SCHOOL OR AT HOME: NONE OF THE TIME
ACT_TOTALSCORE: 23
QUESTION_2 LAST FOUR WEEKS HOW OFTEN HAVE YOU HAD SHORTNESS OF BREATH: ONCE OR TWICE A WEEK
QUESTION_4 LAST FOUR WEEKS HOW OFTEN HAVE YOU USED YOUR RESCUE INHALER OR NEBULIZER MEDICATION (SUCH AS ALBUTEROL): ONCE A WEEK OR LESS

## 2025-08-07 ASSESSMENT — PAIN SCALES - GENERAL: PAINLEVEL_OUTOF10: NO PAIN (0)

## 2025-08-07 ASSESSMENT — SOCIAL DETERMINANTS OF HEALTH (SDOH): HOW OFTEN DO YOU GET TOGETHER WITH FRIENDS OR RELATIVES?: MORE THAN THREE TIMES A WEEK

## 2025-08-10 ENCOUNTER — HOSPITAL ENCOUNTER (OUTPATIENT)
Dept: CARDIOLOGY | Facility: HOSPITAL | Age: 65
Discharge: HOME | End: 2025-08-10
Attending: INTERNAL MEDICINE | Admitting: INTERNAL MEDICINE
Payer: COMMERCIAL

## 2025-08-10 DIAGNOSIS — I25.83 CORONARY ARTERIOSCLEROSIS DUE TO LIPID RICH PLAQUE: ICD-10-CM

## 2025-08-10 DIAGNOSIS — R55 SYNCOPE, UNSPECIFIED SYNCOPE TYPE: ICD-10-CM

## 2025-08-10 PROCEDURE — C8929 TTE W OR WO FOL WCON,DOPPLER: HCPCS

## 2025-08-10 PROCEDURE — 255N000002 HC RX 255 OP 636: Performed by: INTERNAL MEDICINE

## 2025-08-10 PROCEDURE — 93306 TTE W/DOPPLER COMPLETE: CPT | Mod: 26 | Performed by: INTERNAL MEDICINE

## 2025-08-10 RX ADMIN — PERFLUTREN 7 ML (DILUTED): 6.52 INJECTION, SUSPENSION INTRAVENOUS at 08:22

## 2025-08-27 DIAGNOSIS — E78.5 HYPERLIPIDEMIA, UNSPECIFIED HYPERLIPIDEMIA TYPE: ICD-10-CM

## 2025-08-27 DIAGNOSIS — I10 ESSENTIAL HYPERTENSION: ICD-10-CM

## 2025-08-27 RX ORDER — ATORVASTATIN CALCIUM 40 MG/1
40 TABLET, FILM COATED ORAL DAILY
Qty: 90 TABLET | Refills: 3 | Status: SHIPPED | OUTPATIENT
Start: 2025-08-27

## 2025-08-27 RX ORDER — LISINOPRIL 10 MG/1
10 TABLET ORAL DAILY
Qty: 90 TABLET | Refills: 3 | Status: SHIPPED | OUTPATIENT
Start: 2025-08-27